# Patient Record
Sex: FEMALE | Race: WHITE | NOT HISPANIC OR LATINO | Employment: UNEMPLOYED | URBAN - METROPOLITAN AREA
[De-identification: names, ages, dates, MRNs, and addresses within clinical notes are randomized per-mention and may not be internally consistent; named-entity substitution may affect disease eponyms.]

---

## 2018-02-06 ENCOUNTER — CONVERSION ENCOUNTER (OUTPATIENT)
Dept: FAMILY MEDICINE CLINIC | Facility: CLINIC | Age: 58
End: 2018-02-06

## 2018-02-06 ENCOUNTER — OFFICE VISIT CONVERTED (OUTPATIENT)
Dept: FAMILY MEDICINE CLINIC | Facility: CLINIC | Age: 58
End: 2018-02-06
Attending: FAMILY MEDICINE

## 2018-04-24 ENCOUNTER — CONVERSION ENCOUNTER (OUTPATIENT)
Dept: FAMILY MEDICINE CLINIC | Facility: CLINIC | Age: 58
End: 2018-04-24

## 2018-04-24 ENCOUNTER — OFFICE VISIT CONVERTED (OUTPATIENT)
Dept: FAMILY MEDICINE CLINIC | Facility: CLINIC | Age: 58
End: 2018-04-24
Attending: FAMILY MEDICINE

## 2018-05-02 ENCOUNTER — CONVERSION ENCOUNTER (OUTPATIENT)
Dept: FAMILY MEDICINE CLINIC | Facility: CLINIC | Age: 58
End: 2018-05-02

## 2018-05-02 ENCOUNTER — OFFICE VISIT CONVERTED (OUTPATIENT)
Dept: FAMILY MEDICINE CLINIC | Facility: CLINIC | Age: 58
End: 2018-05-02
Attending: NURSE PRACTITIONER

## 2018-08-09 ENCOUNTER — OFFICE VISIT CONVERTED (OUTPATIENT)
Dept: FAMILY MEDICINE CLINIC | Facility: CLINIC | Age: 58
End: 2018-08-09
Attending: NURSE PRACTITIONER

## 2018-11-06 ENCOUNTER — OFFICE VISIT CONVERTED (OUTPATIENT)
Dept: FAMILY MEDICINE CLINIC | Facility: CLINIC | Age: 58
End: 2018-11-06
Attending: FAMILY MEDICINE

## 2019-05-14 ENCOUNTER — OFFICE VISIT CONVERTED (OUTPATIENT)
Dept: FAMILY MEDICINE CLINIC | Facility: CLINIC | Age: 59
End: 2019-05-14
Attending: FAMILY MEDICINE

## 2019-06-05 ENCOUNTER — HOSPITAL ENCOUNTER (OUTPATIENT)
Dept: OTHER | Facility: HOSPITAL | Age: 59
Discharge: HOME | End: 2019-06-05

## 2019-06-05 LAB
ALBUMIN SERPL-MCNC: 4 G/DL (ref 3.5–5)
ALBUMIN/GLOB SERPL: 1.4 {RATIO} (ref 1.4–2.6)
ALP SERPL-CCNC: 87 U/L (ref 53–141)
ALT SERPL-CCNC: 21 U/L (ref 10–40)
ANION GAP SERPL CALC-SCNC: 17 MMOL/L (ref 8–19)
AST SERPL-CCNC: 10 U/L (ref 15–50)
BILIRUB SERPL-MCNC: 0.27 MG/DL (ref 0.2–1.3)
BUN SERPL-MCNC: 18 MG/DL (ref 5–25)
BUN/CREAT SERPL: 15 {RATIO} (ref 6–20)
CALCIUM SERPL-MCNC: 9.1 MG/DL (ref 8.7–10.4)
CHLORIDE SERPL-SCNC: 105 MMOL/L (ref 99–111)
CHOLEST SERPL-MCNC: 141 MG/DL (ref 107–200)
CHOLEST/HDLC SERPL: 3.2 {RATIO} (ref 3–6)
CONV CO2: 25 MMOL/L (ref 22–32)
CONV TOTAL PROTEIN: 6.9 G/DL (ref 6.3–8.2)
CREAT UR-MCNC: 1.19 MG/DL (ref 0.5–0.9)
GFR SERPLBLD BASED ON 1.73 SQ M-ARVRAT: 50 ML/MIN/{1.73_M2}
GLOBULIN UR ELPH-MCNC: 2.9 G/DL (ref 2–3.5)
GLUCOSE SERPL-MCNC: 84 MG/DL (ref 65–99)
HDLC SERPL-MCNC: 44 MG/DL (ref 40–60)
LDLC SERPL CALC-MCNC: 72 MG/DL (ref 70–100)
OSMOLALITY SERPL CALC.SUM OF ELEC: 297 MOSM/KG (ref 273–304)
POTASSIUM SERPL-SCNC: 4.4 MMOL/L (ref 3.5–5.3)
SODIUM SERPL-SCNC: 143 MMOL/L (ref 135–147)
TRIGL SERPL-MCNC: 124 MG/DL (ref 40–150)
VLDLC SERPL-MCNC: 25 MG/DL (ref 5–37)

## 2019-11-11 ENCOUNTER — OFFICE VISIT CONVERTED (OUTPATIENT)
Dept: FAMILY MEDICINE CLINIC | Facility: CLINIC | Age: 59
End: 2019-11-11
Attending: NURSE PRACTITIONER

## 2019-11-12 ENCOUNTER — HOSPITAL ENCOUNTER (OUTPATIENT)
Dept: FAMILY MEDICINE CLINIC | Facility: CLINIC | Age: 59
Discharge: HOME | End: 2019-11-12
Attending: NURSE PRACTITIONER

## 2019-11-12 LAB
ALBUMIN SERPL-MCNC: 4.1 G/DL (ref 3.5–5)
ALBUMIN/GLOB SERPL: 1.3 {RATIO} (ref 1.4–2.6)
ALP SERPL-CCNC: 91 U/L (ref 53–141)
ALT SERPL-CCNC: 24 U/L (ref 10–40)
AMPHETAMINES UR QL SCN: NEGATIVE
AMYLASE SERPL-CCNC: 98 U/L (ref 30–110)
ANION GAP SERPL CALC-SCNC: 22 MMOL/L (ref 8–19)
APPEARANCE UR: CLEAR
AST SERPL-CCNC: 11 U/L (ref 15–50)
BARBITURATES UR QL SCN: NEGATIVE
BASOPHILS # BLD AUTO: 0.04 10*3/UL (ref 0–0.2)
BASOPHILS NFR BLD AUTO: 0.6 % (ref 0–3)
BENZODIAZ UR QL SCN: NEGATIVE
BILIRUB SERPL-MCNC: 0.24 MG/DL (ref 0.2–1.3)
BILIRUB UR QL: NEGATIVE
BUN SERPL-MCNC: 23 MG/DL (ref 5–25)
BUN/CREAT SERPL: 18 {RATIO} (ref 6–20)
CALCIUM SERPL-MCNC: 9.9 MG/DL (ref 8.7–10.4)
CHLORIDE SERPL-SCNC: 104 MMOL/L (ref 99–111)
CHOLEST SERPL-MCNC: 215 MG/DL (ref 107–200)
CHOLEST/HDLC SERPL: 4.9 {RATIO} (ref 3–6)
COLOR UR: YELLOW
CONV ABS IMM GRAN: 0.01 10*3/UL (ref 0–0.2)
CONV BACTERIA: NEGATIVE
CONV CO2: 20 MMOL/L (ref 22–32)
CONV COCAINE, UR: NEGATIVE
CONV COLLECTION SOURCE (UA): ABNORMAL
CONV CREATININE URINE, RANDOM: 138.4 MG/DL (ref 10–300)
CONV HYALINE CASTS IN URINE MICRO: ABNORMAL /[LPF]
CONV IMMATURE GRAN: 0.1 % (ref 0–1.8)
CONV MICROALBUM.,U,RANDOM: 23.6 MG/L (ref 0–20)
CONV TOTAL PROTEIN: 7.3 G/DL (ref 6.3–8.2)
CONV UROBILINOGEN IN URINE BY AUTOMATED TEST STRIP: 0.2 {EHRLICHU}/DL (ref 0.1–1)
CREAT UR-MCNC: 1.28 MG/DL (ref 0.5–0.9)
DEPRECATED RDW RBC AUTO: 45.1 FL (ref 36.4–46.3)
EOSINOPHIL # BLD AUTO: 0.31 10*3/UL (ref 0–0.7)
EOSINOPHIL # BLD AUTO: 4.5 % (ref 0–7)
ERYTHROCYTE [DISTWIDTH] IN BLOOD BY AUTOMATED COUNT: 13.3 % (ref 11.7–14.4)
GFR SERPLBLD BASED ON 1.73 SQ M-ARVRAT: 46 ML/MIN/{1.73_M2}
GLOBULIN UR ELPH-MCNC: 3.2 G/DL (ref 2–3.5)
GLUCOSE SERPL-MCNC: 89 MG/DL (ref 65–99)
GLUCOSE UR QL: NEGATIVE MG/DL
HCT VFR BLD AUTO: 42.5 % (ref 37–47)
HDLC SERPL-MCNC: 44 MG/DL (ref 40–60)
HGB BLD-MCNC: 13.3 G/DL (ref 12–16)
HGB UR QL STRIP: NEGATIVE
KETONES UR QL STRIP: NEGATIVE MG/DL
LDLC SERPL CALC-MCNC: 129 MG/DL (ref 70–100)
LEUKOCYTE ESTERASE UR QL STRIP: ABNORMAL
LIPASE SERPL-CCNC: 125 U/L (ref 5–51)
LYMPHOCYTES # BLD AUTO: 2.83 10*3/UL (ref 1–5)
LYMPHOCYTES NFR BLD AUTO: 41 % (ref 20–45)
MCH RBC QN AUTO: 29 PG (ref 27–31)
MCHC RBC AUTO-ENTMCNC: 31.3 G/DL (ref 33–37)
MCV RBC AUTO: 92.8 FL (ref 81–99)
METHADONE UR QL SCN: NEGATIVE
MICROALBUMIN/CREAT UR: 17.1 MG/G{CRE} (ref 0–35)
MONOCYTES # BLD AUTO: 0.52 10*3/UL (ref 0.2–1.2)
MONOCYTES NFR BLD AUTO: 7.5 % (ref 3–10)
NEUTROPHILS # BLD AUTO: 3.19 10*3/UL (ref 2–8)
NEUTROPHILS NFR BLD AUTO: 46.3 % (ref 30–85)
NITRITE UR QL STRIP: NEGATIVE
NRBC CBCN: 0 % (ref 0–0.7)
OPIATES TESTED UR SCN: NEGATIVE
OSMOLALITY SERPL CALC.SUM OF ELEC: 293 MOSM/KG (ref 273–304)
OXYCODONE UR QL SCN: NEGATIVE
PCP UR QL: NEGATIVE
PH UR STRIP.AUTO: 5 [PH] (ref 5–8)
PLATELET # BLD AUTO: 283 10*3/UL (ref 130–400)
PMV BLD AUTO: 10.5 FL (ref 9.4–12.3)
POTASSIUM SERPL-SCNC: 5.6 MMOL/L (ref 3.5–5.3)
PROT UR QL: NEGATIVE MG/DL
RBC # BLD AUTO: 4.58 10*6/UL (ref 4.2–5.4)
RBC #/AREA URNS HPF: ABNORMAL /[HPF]
SODIUM SERPL-SCNC: 140 MMOL/L (ref 135–147)
SP GR UR: 1.02 (ref 1–1.03)
SQUAMOUS SPT QL MICRO: ABNORMAL /[HPF]
T4 FREE SERPL-MCNC: 1.2 NG/DL (ref 0.9–1.8)
THC SERPLBLD CFM-MCNC: NEGATIVE NG/ML
TRIGL SERPL-MCNC: 209 MG/DL (ref 40–150)
TSH SERPL-ACNC: 3.74 M[IU]/L (ref 0.27–4.2)
VLDLC SERPL-MCNC: 42 MG/DL (ref 5–37)
WBC # BLD AUTO: 6.9 10*3/UL (ref 4.8–10.8)
WBC #/AREA URNS HPF: ABNORMAL /[HPF]

## 2019-11-18 ENCOUNTER — HOSPITAL ENCOUNTER (OUTPATIENT)
Dept: FAMILY MEDICINE CLINIC | Facility: CLINIC | Age: 59
Discharge: HOME | End: 2019-11-18
Attending: NURSE PRACTITIONER

## 2019-11-18 LAB
ALBUMIN SERPL-MCNC: 3.7 G/DL (ref 3.5–5)
ALBUMIN/GLOB SERPL: 1.3 {RATIO} (ref 1.4–2.6)
ALP SERPL-CCNC: 78 U/L (ref 53–141)
ALT SERPL-CCNC: 18 U/L (ref 10–40)
AMYLASE SERPL-CCNC: 82 U/L (ref 30–110)
ANION GAP SERPL CALC-SCNC: 15 MMOL/L (ref 8–19)
AST SERPL-CCNC: 11 U/L (ref 15–50)
BILIRUB SERPL-MCNC: 0.18 MG/DL (ref 0.2–1.3)
BUN SERPL-MCNC: 19 MG/DL (ref 5–25)
BUN/CREAT SERPL: 17 {RATIO} (ref 6–20)
CALCIUM SERPL-MCNC: 9.7 MG/DL (ref 8.7–10.4)
CHLORIDE SERPL-SCNC: 106 MMOL/L (ref 99–111)
CONV CO2: 24 MMOL/L (ref 22–32)
CONV TOTAL PROTEIN: 6.5 G/DL (ref 6.3–8.2)
CREAT UR-MCNC: 1.11 MG/DL (ref 0.5–0.9)
GFR SERPLBLD BASED ON 1.73 SQ M-ARVRAT: 54 ML/MIN/{1.73_M2}
GLOBULIN UR ELPH-MCNC: 2.8 G/DL (ref 2–3.5)
GLUCOSE SERPL-MCNC: 117 MG/DL (ref 65–99)
LIPASE SERPL-CCNC: 120 U/L (ref 5–51)
OSMOLALITY SERPL CALC.SUM OF ELEC: 293 MOSM/KG (ref 273–304)
POTASSIUM SERPL-SCNC: 4.9 MMOL/L (ref 3.5–5.3)
SODIUM SERPL-SCNC: 140 MMOL/L (ref 135–147)

## 2019-12-16 ENCOUNTER — OFFICE VISIT CONVERTED (OUTPATIENT)
Dept: GASTROENTEROLOGY | Facility: CLINIC | Age: 59
End: 2019-12-16
Attending: NURSE PRACTITIONER

## 2019-12-16 ENCOUNTER — CONVERSION ENCOUNTER (OUTPATIENT)
Dept: GASTROENTEROLOGY | Facility: CLINIC | Age: 59
End: 2019-12-16

## 2020-01-07 ENCOUNTER — HOSPITAL ENCOUNTER (OUTPATIENT)
Dept: GASTROENTEROLOGY | Facility: HOSPITAL | Age: 60
Setting detail: HOSPITAL OUTPATIENT SURGERY
Discharge: HOME | End: 2020-01-07
Attending: INTERNAL MEDICINE

## 2020-01-22 ENCOUNTER — HOSPITAL ENCOUNTER (OUTPATIENT)
Dept: GENERAL RADIOLOGY | Facility: HOSPITAL | Age: 60
Discharge: HOME | End: 2020-01-22
Attending: NURSE PRACTITIONER

## 2020-02-13 ENCOUNTER — OFFICE VISIT CONVERTED (OUTPATIENT)
Dept: FAMILY MEDICINE CLINIC | Facility: CLINIC | Age: 60
End: 2020-02-13
Attending: NURSE PRACTITIONER

## 2020-02-24 ENCOUNTER — HOSPITAL ENCOUNTER (OUTPATIENT)
Dept: FAMILY MEDICINE CLINIC | Facility: CLINIC | Age: 60
Discharge: HOME | End: 2020-02-24
Attending: NURSE PRACTITIONER

## 2020-02-24 LAB
ALBUMIN SERPL-MCNC: 3.8 G/DL (ref 3.5–5)
ALBUMIN/GLOB SERPL: 1.2 {RATIO} (ref 1.4–2.6)
ALP SERPL-CCNC: 71 U/L (ref 53–141)
ALT SERPL-CCNC: 18 U/L (ref 10–40)
ANION GAP SERPL CALC-SCNC: 19 MMOL/L (ref 8–19)
AST SERPL-CCNC: 10 U/L (ref 15–50)
BILIRUB SERPL-MCNC: 0.32 MG/DL (ref 0.2–1.3)
BUN SERPL-MCNC: 20 MG/DL (ref 5–25)
BUN/CREAT SERPL: 16 {RATIO} (ref 6–20)
CALCIUM SERPL-MCNC: 9.8 MG/DL (ref 8.7–10.4)
CHLORIDE SERPL-SCNC: 104 MMOL/L (ref 99–111)
CHOLEST SERPL-MCNC: 232 MG/DL (ref 107–200)
CHOLEST/HDLC SERPL: 5 {RATIO} (ref 3–6)
CONV CO2: 24 MMOL/L (ref 22–32)
CONV TOTAL PROTEIN: 6.9 G/DL (ref 6.3–8.2)
CREAT UR-MCNC: 1.28 MG/DL (ref 0.5–0.9)
GFR SERPLBLD BASED ON 1.73 SQ M-ARVRAT: 46 ML/MIN/{1.73_M2}
GLOBULIN UR ELPH-MCNC: 3.1 G/DL (ref 2–3.5)
GLUCOSE SERPL-MCNC: 90 MG/DL (ref 65–99)
HDLC SERPL-MCNC: 46 MG/DL (ref 40–60)
LDLC SERPL CALC-MCNC: 150 MG/DL (ref 70–100)
OSMOLALITY SERPL CALC.SUM OF ELEC: 296 MOSM/KG (ref 273–304)
POTASSIUM SERPL-SCNC: 5.3 MMOL/L (ref 3.5–5.3)
SODIUM SERPL-SCNC: 142 MMOL/L (ref 135–147)
TRIGL SERPL-MCNC: 180 MG/DL (ref 40–150)
VLDLC SERPL-MCNC: 36 MG/DL (ref 5–37)

## 2020-05-12 ENCOUNTER — TELEMEDICINE CONVERTED (OUTPATIENT)
Dept: FAMILY MEDICINE CLINIC | Facility: CLINIC | Age: 60
End: 2020-05-12
Attending: NURSE PRACTITIONER

## 2020-06-02 ENCOUNTER — HOSPITAL ENCOUNTER (OUTPATIENT)
Dept: FAMILY MEDICINE CLINIC | Facility: CLINIC | Age: 60
Discharge: HOME | End: 2020-06-02
Attending: NURSE PRACTITIONER

## 2020-06-02 LAB
ALBUMIN SERPL-MCNC: 4.1 G/DL (ref 3.5–5)
ALBUMIN/GLOB SERPL: 1.3 {RATIO} (ref 1.4–2.6)
ALP SERPL-CCNC: 70 U/L (ref 53–141)
ALT SERPL-CCNC: 16 U/L (ref 10–40)
ANION GAP SERPL CALC-SCNC: 16 MMOL/L (ref 8–19)
AST SERPL-CCNC: 10 U/L (ref 15–50)
BASOPHILS # BLD AUTO: 0.03 10*3/UL (ref 0–0.2)
BASOPHILS NFR BLD AUTO: 0.4 % (ref 0–3)
BILIRUB SERPL-MCNC: 0.24 MG/DL (ref 0.2–1.3)
BUN SERPL-MCNC: 16 MG/DL (ref 5–25)
BUN/CREAT SERPL: 12 {RATIO} (ref 6–20)
CALCIUM SERPL-MCNC: 9.7 MG/DL (ref 8.7–10.4)
CHLORIDE SERPL-SCNC: 106 MMOL/L (ref 99–111)
CHOLEST SERPL-MCNC: 195 MG/DL (ref 107–200)
CHOLEST/HDLC SERPL: 4.3 {RATIO} (ref 3–6)
CONV ABS IMM GRAN: 0.02 10*3/UL (ref 0–0.2)
CONV CO2: 24 MMOL/L (ref 22–32)
CONV IMMATURE GRAN: 0.3 % (ref 0–1.8)
CONV TOTAL PROTEIN: 7.3 G/DL (ref 6.3–8.2)
CREAT UR-MCNC: 1.35 MG/DL (ref 0.5–0.9)
DEPRECATED RDW RBC AUTO: 44.4 FL (ref 36.4–46.3)
EOSINOPHIL # BLD AUTO: 0.25 10*3/UL (ref 0–0.7)
EOSINOPHIL # BLD AUTO: 3.7 % (ref 0–7)
ERYTHROCYTE [DISTWIDTH] IN BLOOD BY AUTOMATED COUNT: 13.2 % (ref 11.7–14.4)
GFR SERPLBLD BASED ON 1.73 SQ M-ARVRAT: 43 ML/MIN/{1.73_M2}
GLOBULIN UR ELPH-MCNC: 3.2 G/DL (ref 2–3.5)
GLUCOSE SERPL-MCNC: 97 MG/DL (ref 65–99)
HCT VFR BLD AUTO: 41.9 % (ref 37–47)
HDLC SERPL-MCNC: 45 MG/DL (ref 40–60)
HGB BLD-MCNC: 13.3 G/DL (ref 12–16)
LDLC SERPL CALC-MCNC: 119 MG/DL (ref 70–100)
LYMPHOCYTES # BLD AUTO: 2.55 10*3/UL (ref 1–5)
LYMPHOCYTES NFR BLD AUTO: 37.6 % (ref 20–45)
MCH RBC QN AUTO: 29.3 PG (ref 27–31)
MCHC RBC AUTO-ENTMCNC: 31.7 G/DL (ref 33–37)
MCV RBC AUTO: 92.3 FL (ref 81–99)
MONOCYTES # BLD AUTO: 0.43 10*3/UL (ref 0.2–1.2)
MONOCYTES NFR BLD AUTO: 6.3 % (ref 3–10)
NEUTROPHILS # BLD AUTO: 3.51 10*3/UL (ref 2–8)
NEUTROPHILS NFR BLD AUTO: 51.7 % (ref 30–85)
NRBC CBCN: 0 % (ref 0–0.7)
OSMOLALITY SERPL CALC.SUM OF ELEC: 293 MOSM/KG (ref 273–304)
PLATELET # BLD AUTO: 289 10*3/UL (ref 130–400)
PMV BLD AUTO: 10.5 FL (ref 9.4–12.3)
POTASSIUM SERPL-SCNC: 4.7 MMOL/L (ref 3.5–5.3)
RBC # BLD AUTO: 4.54 10*6/UL (ref 4.2–5.4)
SODIUM SERPL-SCNC: 141 MMOL/L (ref 135–147)
TRIGL SERPL-MCNC: 156 MG/DL (ref 40–150)
VLDLC SERPL-MCNC: 31 MG/DL (ref 5–37)
WBC # BLD AUTO: 6.79 10*3/UL (ref 4.8–10.8)

## 2020-08-12 ENCOUNTER — HOSPITAL ENCOUNTER (OUTPATIENT)
Dept: FAMILY MEDICINE CLINIC | Facility: CLINIC | Age: 60
Discharge: HOME | End: 2020-08-12
Attending: NURSE PRACTITIONER

## 2020-08-12 ENCOUNTER — OFFICE VISIT CONVERTED (OUTPATIENT)
Dept: FAMILY MEDICINE CLINIC | Facility: CLINIC | Age: 60
End: 2020-08-12
Attending: NURSE PRACTITIONER

## 2020-08-12 LAB
BASOPHILS # BLD AUTO: 0.05 10*3/UL (ref 0–0.2)
BASOPHILS NFR BLD AUTO: 0.6 % (ref 0–3)
CONV ABS IMM GRAN: 0.04 10*3/UL (ref 0–0.2)
CONV IMMATURE GRAN: 0.5 % (ref 0–1.8)
DEPRECATED RDW RBC AUTO: 45.6 FL (ref 36.4–46.3)
EOSINOPHIL # BLD AUTO: 0.2 10*3/UL (ref 0–0.7)
EOSINOPHIL # BLD AUTO: 2.4 % (ref 0–7)
ERYTHROCYTE [DISTWIDTH] IN BLOOD BY AUTOMATED COUNT: 13.6 % (ref 11.7–14.4)
FOLATE SERPL-MCNC: 16.1 NG/ML (ref 4.8–20)
HCT VFR BLD AUTO: 42.2 % (ref 37–47)
HGB BLD-MCNC: 13.5 G/DL (ref 12–16)
LYMPHOCYTES # BLD AUTO: 2.95 10*3/UL (ref 1–5)
LYMPHOCYTES NFR BLD AUTO: 35.7 % (ref 20–45)
MCH RBC QN AUTO: 29.2 PG (ref 27–31)
MCHC RBC AUTO-ENTMCNC: 32 G/DL (ref 33–37)
MCV RBC AUTO: 91.1 FL (ref 81–99)
MONOCYTES # BLD AUTO: 0.55 10*3/UL (ref 0.2–1.2)
MONOCYTES NFR BLD AUTO: 6.7 % (ref 3–10)
NEUTROPHILS # BLD AUTO: 4.48 10*3/UL (ref 2–8)
NEUTROPHILS NFR BLD AUTO: 54.1 % (ref 30–85)
NRBC CBCN: 0 % (ref 0–0.7)
PLATELET # BLD AUTO: 327 10*3/UL (ref 130–400)
PMV BLD AUTO: 10.4 FL (ref 9.4–12.3)
RBC # BLD AUTO: 4.63 10*6/UL (ref 4.2–5.4)
VIT B12 SERPL-MCNC: 367 PG/ML (ref 211–911)
WBC # BLD AUTO: 8.27 10*3/UL (ref 4.8–10.8)

## 2020-08-13 LAB
ALBUMIN SERPL-MCNC: 4.3 G/DL (ref 3.5–5)
ALBUMIN/GLOB SERPL: 1.3 {RATIO} (ref 1.4–2.6)
ALP SERPL-CCNC: 104 U/L (ref 53–141)
ALT SERPL-CCNC: 17 U/L (ref 10–40)
AMYLASE SERPL-CCNC: 76 U/L (ref 30–110)
ANION GAP SERPL CALC-SCNC: 17 MMOL/L (ref 8–19)
AST SERPL-CCNC: 12 U/L (ref 15–50)
BILIRUB SERPL-MCNC: 0.24 MG/DL (ref 0.2–1.3)
BUN SERPL-MCNC: 23 MG/DL (ref 5–25)
BUN/CREAT SERPL: 19 {RATIO} (ref 6–20)
CALCIUM SERPL-MCNC: 10.7 MG/DL (ref 8.7–10.4)
CHLORIDE SERPL-SCNC: 105 MMOL/L (ref 99–111)
CONV CO2: 24 MMOL/L (ref 22–32)
CONV TOTAL PROTEIN: 7.7 G/DL (ref 6.3–8.2)
CREAT UR-MCNC: 1.23 MG/DL (ref 0.5–0.9)
FERRITIN SERPL-MCNC: 72 NG/ML (ref 10–200)
GFR SERPLBLD BASED ON 1.73 SQ M-ARVRAT: 48 ML/MIN/{1.73_M2}
GLOBULIN UR ELPH-MCNC: 3.4 G/DL (ref 2–3.5)
GLUCOSE SERPL-MCNC: 94 MG/DL (ref 65–99)
IRON SATN MFR SERPL: 17 % (ref 20–55)
IRON SERPL-MCNC: 59 UG/DL (ref 60–170)
LIPASE SERPL-CCNC: 50 U/L (ref 5–51)
OSMOLALITY SERPL CALC.SUM OF ELEC: 295 MOSM/KG (ref 273–304)
POTASSIUM SERPL-SCNC: 5.3 MMOL/L (ref 3.5–5.3)
SODIUM SERPL-SCNC: 141 MMOL/L (ref 135–147)
T4 FREE SERPL-MCNC: 1.2 NG/DL (ref 0.9–1.8)
TIBC SERPL-MCNC: 345 UG/DL (ref 245–450)
TRANSFERRIN SERPL-MCNC: 241 MG/DL (ref 250–380)
TSH SERPL-ACNC: 4.07 M[IU]/L (ref 0.27–4.2)

## 2020-09-21 ENCOUNTER — HOSPITAL ENCOUNTER (OUTPATIENT)
Dept: OTHER | Facility: HOSPITAL | Age: 60
Discharge: HOME | End: 2020-09-21
Attending: NURSE PRACTITIONER

## 2020-09-21 LAB
25(OH)D3 SERPL-MCNC: 18.4 NG/ML (ref 30–100)
ALBUMIN SERPL-MCNC: 4 G/DL (ref 3.5–5)
ANION GAP SERPL CALC-SCNC: 16 MMOL/L (ref 8–19)
APPEARANCE UR: CLEAR
BASOPHILS # BLD AUTO: 0.04 10*3/UL (ref 0–0.2)
BASOPHILS NFR BLD AUTO: 0.4 % (ref 0–3)
BILIRUB UR QL: NEGATIVE
BUN SERPL-MCNC: 20 MG/DL (ref 5–25)
BUN/CREAT SERPL: 17 {RATIO} (ref 6–20)
CALCIUM SERPL-MCNC: 9 MG/DL (ref 8.7–10.4)
CHLORIDE SERPL-SCNC: 103 MMOL/L (ref 99–111)
COLOR UR: YELLOW
CONV ABS IMM GRAN: 0.03 10*3/UL (ref 0–0.2)
CONV CO2: 24 MMOL/L (ref 22–32)
CONV COLLECTION SOURCE (UA): NORMAL
CONV CREATININE URINE, RANDOM: 242 MG/DL (ref 10–300)
CONV IMMATURE GRAN: 0.3 % (ref 0–1.8)
CONV PROTEIN TO CREATININE RATIO (RANDOM URINE): 0.07 {RATIO} (ref 0–0.1)
CONV UROBILINOGEN IN URINE BY AUTOMATED TEST STRIP: 1 {EHRLICHU}/DL (ref 0.1–1)
CREAT UR-MCNC: 1.18 MG/DL (ref 0.5–0.9)
DEPRECATED RDW RBC AUTO: 45 FL (ref 36.4–46.3)
EOSINOPHIL # BLD AUTO: 0.28 10*3/UL (ref 0–0.7)
EOSINOPHIL # BLD AUTO: 2.9 % (ref 0–7)
ERYTHROCYTE [DISTWIDTH] IN BLOOD BY AUTOMATED COUNT: 13.5 % (ref 11.7–14.4)
GFR SERPLBLD BASED ON 1.73 SQ M-ARVRAT: 50 ML/MIN/{1.73_M2}
GLUCOSE SERPL-MCNC: 94 MG/DL (ref 65–99)
GLUCOSE UR QL: NEGATIVE MG/DL
HCT VFR BLD AUTO: 38.7 % (ref 37–47)
HGB BLD-MCNC: 12.9 G/DL (ref 12–16)
HGB UR QL STRIP: NEGATIVE
KETONES UR QL STRIP: NEGATIVE MG/DL
LEUKOCYTE ESTERASE UR QL STRIP: NEGATIVE
LYMPHOCYTES # BLD AUTO: 2.88 10*3/UL (ref 1–5)
LYMPHOCYTES NFR BLD AUTO: 30.2 % (ref 20–45)
MCH RBC QN AUTO: 30.2 PG (ref 27–31)
MCHC RBC AUTO-ENTMCNC: 33.3 G/DL (ref 33–37)
MCV RBC AUTO: 90.6 FL (ref 81–99)
MONOCYTES # BLD AUTO: 0.59 10*3/UL (ref 0.2–1.2)
MONOCYTES NFR BLD AUTO: 6.2 % (ref 3–10)
NEUTROPHILS # BLD AUTO: 5.73 10*3/UL (ref 2–8)
NEUTROPHILS NFR BLD AUTO: 60 % (ref 30–85)
NITRITE UR QL STRIP: NEGATIVE
NRBC CBCN: 0 % (ref 0–0.7)
PH UR STRIP.AUTO: 5 [PH] (ref 5–8)
PHOSPHATE SERPL-MCNC: 3 MG/DL (ref 2.4–4.5)
PLATELET # BLD AUTO: 276 10*3/UL (ref 130–400)
PMV BLD AUTO: 10.1 FL (ref 9.4–12.3)
POTASSIUM SERPL-SCNC: 4.2 MMOL/L (ref 3.5–5.3)
PROT UR QL: NEGATIVE MG/DL
PROT UR-MCNC: 16.1 MG/DL
RBC # BLD AUTO: 4.27 10*6/UL (ref 4.2–5.4)
SODIUM SERPL-SCNC: 139 MMOL/L (ref 135–147)
SP GR UR: 1.02 (ref 1–1.03)
WBC # BLD AUTO: 9.55 10*3/UL (ref 4.8–10.8)

## 2020-10-16 ENCOUNTER — HOSPITAL ENCOUNTER (OUTPATIENT)
Dept: FAMILY MEDICINE CLINIC | Facility: CLINIC | Age: 60
Discharge: HOME | End: 2020-10-16

## 2020-10-18 LAB — SARS-COV-2 RNA SPEC QL NAA+PROBE: NOT DETECTED

## 2020-10-21 ENCOUNTER — HOSPITAL ENCOUNTER (OUTPATIENT)
Dept: OTHER | Facility: HOSPITAL | Age: 60
Discharge: HOME | End: 2020-10-21
Attending: NURSE PRACTITIONER

## 2020-10-21 LAB — PTH-INTACT SERPL-MCNC: 54 PG/ML (ref 11.1–79.5)

## 2020-11-04 ENCOUNTER — OFFICE VISIT CONVERTED (OUTPATIENT)
Dept: SURGERY | Facility: CLINIC | Age: 60
End: 2020-11-04
Attending: SURGERY

## 2020-11-05 ENCOUNTER — HOSPITAL ENCOUNTER (OUTPATIENT)
Dept: NUCLEAR MEDICINE | Facility: HOSPITAL | Age: 60
Discharge: HOME | End: 2020-11-05
Attending: SURGERY

## 2020-11-05 LAB — SARS-COV-2 RNA SPEC QL NAA+PROBE: NOT DETECTED

## 2020-11-18 ENCOUNTER — OFFICE VISIT CONVERTED (OUTPATIENT)
Dept: SURGERY | Facility: CLINIC | Age: 60
End: 2020-11-18
Attending: SURGERY

## 2020-11-20 ENCOUNTER — OFFICE VISIT CONVERTED (OUTPATIENT)
Dept: FAMILY MEDICINE CLINIC | Facility: CLINIC | Age: 60
End: 2020-11-20
Attending: NURSE PRACTITIONER

## 2020-11-24 ENCOUNTER — HOSPITAL ENCOUNTER (OUTPATIENT)
Dept: FAMILY MEDICINE CLINIC | Facility: CLINIC | Age: 60
Discharge: HOME | End: 2020-11-24
Attending: NURSE PRACTITIONER

## 2020-11-24 LAB
ALBUMIN SERPL-MCNC: 4 G/DL (ref 3.5–5)
ALBUMIN/GLOB SERPL: 1 {RATIO} (ref 1.4–2.6)
ALP SERPL-CCNC: 165 U/L (ref 53–141)
ALT SERPL-CCNC: 33 U/L (ref 10–40)
AMPHETAMINES UR QL SCN: NEGATIVE
ANION GAP SERPL CALC-SCNC: 19 MMOL/L (ref 8–19)
APPEARANCE UR: CLEAR
AST SERPL-CCNC: 14 U/L (ref 15–50)
BARBITURATES UR QL SCN: NEGATIVE
BASOPHILS # BLD AUTO: 0.05 10*3/UL (ref 0–0.2)
BASOPHILS NFR BLD AUTO: 0.7 % (ref 0–3)
BENZODIAZ UR QL SCN: NEGATIVE
BILIRUB SERPL-MCNC: 0.27 MG/DL (ref 0.2–1.3)
BILIRUB UR QL: NEGATIVE
BUN SERPL-MCNC: 22 MG/DL (ref 5–25)
BUN/CREAT SERPL: 17 {RATIO} (ref 6–20)
CALCIUM SERPL-MCNC: 10 MG/DL (ref 8.7–10.4)
CHLORIDE SERPL-SCNC: 102 MMOL/L (ref 99–111)
CHOLEST SERPL-MCNC: 203 MG/DL (ref 107–200)
CHOLEST/HDLC SERPL: 4.5 {RATIO} (ref 3–6)
COLOR UR: YELLOW
CONV ABS IMM GRAN: 0.04 10*3/UL (ref 0–0.2)
CONV CO2: 26 MMOL/L (ref 22–32)
CONV COCAINE, UR: NEGATIVE
CONV COLLECTION SOURCE (UA): NORMAL
CONV CREATININE URINE, RANDOM: 52.6 MG/DL (ref 10–300)
CONV IMMATURE GRAN: 0.6 % (ref 0–1.8)
CONV MICROALBUM.,U,RANDOM: <12 MG/L (ref 0–20)
CONV TOTAL PROTEIN: 8 G/DL (ref 6.3–8.2)
CONV UROBILINOGEN IN URINE BY AUTOMATED TEST STRIP: 0.2 {EHRLICHU}/DL (ref 0.1–1)
CREAT UR-MCNC: 1.29 MG/DL (ref 0.5–0.9)
DEPRECATED RDW RBC AUTO: 41.5 FL (ref 36.4–46.3)
EOSINOPHIL # BLD AUTO: 0.36 10*3/UL (ref 0–0.7)
EOSINOPHIL # BLD AUTO: 5 % (ref 0–7)
ERYTHROCYTE [DISTWIDTH] IN BLOOD BY AUTOMATED COUNT: 12.4 % (ref 11.7–14.4)
FERRITIN SERPL-MCNC: 121 NG/ML (ref 10–200)
GFR SERPLBLD BASED ON 1.73 SQ M-ARVRAT: 45 ML/MIN/{1.73_M2}
GLOBULIN UR ELPH-MCNC: 4 G/DL (ref 2–3.5)
GLUCOSE SERPL-MCNC: 92 MG/DL (ref 65–99)
GLUCOSE UR QL: NEGATIVE MG/DL
HCT VFR BLD AUTO: 40.4 % (ref 37–47)
HDLC SERPL-MCNC: 45 MG/DL (ref 40–60)
HGB BLD-MCNC: 12.5 G/DL (ref 12–16)
HGB UR QL STRIP: NEGATIVE
IRON SATN MFR SERPL: 20 % (ref 20–55)
IRON SERPL-MCNC: 57 UG/DL (ref 60–170)
KETONES UR QL STRIP: NEGATIVE MG/DL
LDLC SERPL CALC-MCNC: 137 MG/DL (ref 70–100)
LEUKOCYTE ESTERASE UR QL STRIP: NEGATIVE
LYMPHOCYTES # BLD AUTO: 2.07 10*3/UL (ref 1–5)
LYMPHOCYTES NFR BLD AUTO: 29 % (ref 20–45)
MCH RBC QN AUTO: 28 PG (ref 27–31)
MCHC RBC AUTO-ENTMCNC: 30.9 G/DL (ref 33–37)
MCV RBC AUTO: 90.6 FL (ref 81–99)
METHADONE UR QL SCN: NEGATIVE
MICROALBUMIN/CREAT UR: 22.8 MG/G{CRE} (ref 0–35)
MONOCYTES # BLD AUTO: 0.54 10*3/UL (ref 0.2–1.2)
MONOCYTES NFR BLD AUTO: 7.6 % (ref 3–10)
NEUTROPHILS # BLD AUTO: 4.07 10*3/UL (ref 2–8)
NEUTROPHILS NFR BLD AUTO: 57.1 % (ref 30–85)
NITRITE UR QL STRIP: NEGATIVE
NRBC CBCN: 0 % (ref 0–0.7)
OPIATES TESTED UR SCN: NEGATIVE
OSMOLALITY SERPL CALC.SUM OF ELEC: 297 MOSM/KG (ref 273–304)
OXYCODONE UR QL SCN: NEGATIVE
PCP UR QL: NEGATIVE
PH UR STRIP.AUTO: 6.5 [PH] (ref 5–8)
PLATELET # BLD AUTO: 431 10*3/UL (ref 130–400)
PMV BLD AUTO: 9.2 FL (ref 9.4–12.3)
POTASSIUM SERPL-SCNC: 5.3 MMOL/L (ref 3.5–5.3)
PROT UR QL: NEGATIVE MG/DL
RBC # BLD AUTO: 4.46 10*6/UL (ref 4.2–5.4)
SODIUM SERPL-SCNC: 142 MMOL/L (ref 135–147)
SP GR UR: 1.01 (ref 1–1.03)
THC SERPLBLD CFM-MCNC: NEGATIVE NG/ML
TIBC SERPL-MCNC: 286 UG/DL (ref 245–450)
TRANSFERRIN SERPL-MCNC: 200 MG/DL (ref 250–380)
TRIGL SERPL-MCNC: 105 MG/DL (ref 40–150)
VLDLC SERPL-MCNC: 21 MG/DL (ref 5–37)
WBC # BLD AUTO: 7.13 10*3/UL (ref 4.8–10.8)

## 2020-11-25 ENCOUNTER — OFFICE VISIT CONVERTED (OUTPATIENT)
Dept: SURGERY | Facility: CLINIC | Age: 60
End: 2020-11-25
Attending: SURGERY

## 2020-11-27 ENCOUNTER — HOSPITAL ENCOUNTER (OUTPATIENT)
Dept: INFUSION THERAPY | Facility: HOSPITAL | Age: 60
Discharge: HOME | End: 2020-11-27
Attending: SURGERY

## 2020-11-29 LAB
CONV RESULTS MISCELLANEOUS REFERENCE LAB TEST: NORMAL
CONV RESULTS MISCELLANEOUS REFERENCE LAB TEST: NORMAL
CONV TEST NAME: NORMAL

## 2020-12-02 ENCOUNTER — OFFICE VISIT CONVERTED (OUTPATIENT)
Dept: SURGERY | Facility: CLINIC | Age: 60
End: 2020-12-02
Attending: SURGERY

## 2021-01-19 ENCOUNTER — TELEMEDICINE CONVERTED (OUTPATIENT)
Dept: FAMILY MEDICINE CLINIC | Facility: CLINIC | Age: 61
End: 2021-01-19
Attending: NURSE PRACTITIONER

## 2021-01-19 ENCOUNTER — HOSPITAL ENCOUNTER (OUTPATIENT)
Dept: FAMILY MEDICINE CLINIC | Facility: CLINIC | Age: 61
Discharge: HOME | End: 2021-01-19
Attending: NURSE PRACTITIONER

## 2021-01-19 LAB
ALBUMIN SERPL-MCNC: 4.1 G/DL (ref 3.5–5)
ALP SERPL-CCNC: 96 U/L (ref 53–141)
ALT SERPL-CCNC: 18 U/L (ref 10–40)
AST SERPL-CCNC: 13 U/L (ref 15–50)
BASOPHILS # BLD AUTO: 0.03 10*3/UL (ref 0–0.2)
BASOPHILS NFR BLD AUTO: 0.5 % (ref 0–3)
BILIRUB SERPL-MCNC: 0.25 MG/DL (ref 0.2–1.3)
CONV ABS IMM GRAN: 0.02 10*3/UL (ref 0–0.2)
CONV BILI, CONJUGATED: <0.2 MG/DL (ref 0–0.6)
CONV IMMATURE GRAN: 0.4 % (ref 0–1.8)
CONV TOTAL PROTEIN: 7.1 G/DL (ref 6.3–8.2)
CONV UNCONJUGATED BILIRUBIN: 0.1 MG/DL (ref 0–1.1)
DEPRECATED RDW RBC AUTO: 45 FL (ref 36.4–46.3)
EOSINOPHIL # BLD AUTO: 0.22 10*3/UL (ref 0–0.7)
EOSINOPHIL # BLD AUTO: 3.9 % (ref 0–7)
ERYTHROCYTE [DISTWIDTH] IN BLOOD BY AUTOMATED COUNT: 13.7 % (ref 11.7–14.4)
HCT VFR BLD AUTO: 40 % (ref 37–47)
HGB BLD-MCNC: 12.7 G/DL (ref 12–16)
LYMPHOCYTES # BLD AUTO: 2.15 10*3/UL (ref 1–5)
LYMPHOCYTES NFR BLD AUTO: 38.3 % (ref 20–45)
MCH RBC QN AUTO: 28.5 PG (ref 27–31)
MCHC RBC AUTO-ENTMCNC: 31.8 G/DL (ref 33–37)
MCV RBC AUTO: 89.7 FL (ref 81–99)
MONOCYTES # BLD AUTO: 0.36 10*3/UL (ref 0.2–1.2)
MONOCYTES NFR BLD AUTO: 6.4 % (ref 3–10)
NEUTROPHILS # BLD AUTO: 2.83 10*3/UL (ref 2–8)
NEUTROPHILS NFR BLD AUTO: 50.5 % (ref 30–85)
NRBC CBCN: 0 % (ref 0–0.7)
PLATELET # BLD AUTO: 301 10*3/UL (ref 130–400)
PMV BLD AUTO: 9.9 FL (ref 9.4–12.3)
RBC # BLD AUTO: 4.46 10*6/UL (ref 4.2–5.4)
WBC # BLD AUTO: 5.61 10*3/UL (ref 4.8–10.8)

## 2021-02-25 ENCOUNTER — OFFICE VISIT CONVERTED (OUTPATIENT)
Dept: FAMILY MEDICINE CLINIC | Facility: CLINIC | Age: 61
End: 2021-02-25
Attending: NURSE PRACTITIONER

## 2021-05-07 NOTE — PROGRESS NOTES
Progress Note      Patient Name: Marija Burgos   Patient ID: [id number]   Sex: Female   YOB: 1960    Primary Care Provider: Michelle VERNON   Referring Provider: Michelle VERNON    Visit Date: August 12, 2020    Provider: SAULO Mccann   Location: [facility]   Location Address: [address] [place] [address]   [place]  [id number]   Location Phone: [phone]          Chief Complaint     refills  diarrhea for several wks       History Of Present Illness  Marija Burgos is a 60 year old /White female who presents for evaluation and treatment of:      refill on Tramadol, and the diarrhea has been back for several weeks.     She has osteoarthritis of the hips bilaterally, along with stage III CKD - she has been prescribed tramadol for years - she takes 50mg TID - usually two in the AM prior to going to work and then one in the afternoon. If she doesn't work, she may only take 1-2 of them. This has seemingly worked well for her.     She has been to see nephrology recently - Dr. Shay, she believes, and her follow up will be later with Dr. Nance - they have told her to lay off the Excedrin migraine - she doesn't have migraine headaches, but she has been taking up to 4 of these daily for the caffeine - she was unaware that they contained NSAIDs. She just feels really fatigued without the added caffeine.     She has been having diarrhea again - was seen for it last fall - referred to GI, Dr. Sahni - found to have pyloric stenosis, grade B esophagitis, and a hiatal hernia - they recommended a c'scope as well but she deferred and cologuard was done which was negative. She went to see Dr. Villarreal in March for the pyloric stenosis - he recommended GES, CT scan, EUS, and c'scope but she deferred all of that. The diarrhea was recurrent in April, then resolved, and now it is back again - she is having painless, nocturnal stools - she will get up several times per  night. If she moves her bowels during the day then it is a loose stool. No nausea or vomiting. The diarrhea is worse should she lay down vs. sleeping sitting up.     She has noticed dizziness in the past few weeks, so she started checking her BP at home - she has had readings as low as 97/49, but average seems to be 110 to 120 over 60s. No chest pain or palpitations. No headaches, shortness of breath, or swelling in her legs.       Past Medical History  Disease Name Date Onset Notes   GERD (gastroesophageal reflux disease) --  --    High risk medications (not anticoagulants) long-term use 05/12/2020 --    Hyperlipidemia --  --    Hypertension, Benign Essential --  --    Osteoarthritis 05/12/2020 --    Stage III chronic kidney disease 05/12/2020 --    Statin not tolerated 05/12/2020 --          Past Surgical History  Procedure Name Date Notes   *Denies any surgical procedures --  --          Medication List  Name Date Started Instructions   lisinopril 40 mg oral tablet 05/12/2020 take 1 tablet (40 mg) by oral route once daily for 90 days   tramadol 50 mg oral tablet 05/12/2020 take 2 takes Q am PO, then 1 tab Q evening for OA pain         Allergy List  Allergen Name Date Reaction Notes   atorvastatin --  aches --    Neosporin --  --  --        Allergies Reconciled  Family Medical History  Disease Name Relative/Age Notes   Arthrtis Mother/   Mother         Social History  Finding Status Start/Stop Quantity Notes   Alcohol Former --/-- --  drank alcohol in the past, 7 or less drinks per week   Exercises regularly --  --/-- --  0 times per week   Recreational Drug Use Never --/-- --  never used   Second hand smoke exposure Unknown --/-- --  no   Tobacco Former --/-- 0.5 PPD former smoker, smokes less than 1 pack per day, for 10 years, never uses other tobacco products   Uses seatbelts --  --/-- --  no         Review of Systems  · Constitutional  o Admits  o : fatigue  o Denies  o : fever, chills, body  aches  · Eyes  o Denies  o : blurred vision, changes in vision  · HENT  o Denies  o : headaches, vertigo  · Cardiovascular  o Denies  o : chest pain, syncope, dyspnea on exertion, lower extremity edema, palpitations  · Respiratory  o Denies  o : shortness of breath, wheezing, cough  · Gastrointestinal  o Admits  o : diarrhea  o Denies  o : nausea, vomiting, loss of appetite, dysphagia, abdominal pain, blood in stools, hematochezia  · Genitourinary  o Denies  o : dysuria, urinary retention  · Integument  o Denies  o : pigmentation changes, skin dryness, hair growth change, nail changes  · Neurologic  o Admits  o : dizziness  · Musculoskeletal  o Admits  o : joint pain, hip pain  · Endocrine  o Denies  o : polyuria, polydipsia, cold intolerance, heat intolerance      Vitals  Date Time BP Position Site L\R Cuff Size HR RR TEMP (F) WT  HT  BMI kg/m2 BSA m2 O2 Sat HC       08/12/2020 02:48 /80 Sitting    105 - R  98.4 241lbs 16oz    96 %          Physical Examination  · Constitutional  o Appearance  o : well developed, well-nourished, in no acute distress  · Eyes  o Conjunctivae  o : conjunctivae normal, no exudates present  · Neck  o Inspection/Palpation  o : normal appearance, no masses or tenderness, trachea midline  o Thyroid  o : no thyromegaly  · Respiratory  o Respiratory Effort  o : breathing unlabored  o Auscultation of Lungs  o : clear to auscultation  · Cardiovascular  o Heart  o :   § Auscultation of Heart  § : regular rate, normal rhythm, no murmurs present  o Peripheral Vascular System  o :   § Carotid Arteries  § : normal pulses bilaterally, no bruits present  § Pedal Pulses  § : bilateral pulse strength 2+  § Extremities  § : no edema  · Gastrointestinal  o Abdominal Examination  o :   § Abdomen  § : soft, non-tender, non-distended, bowel sounds +  · Lymphatic  o Neck  o : no lymphadenopathy present  · Musculoskeletal  o General  o :   § General Musculoskeletal  § : Muscle tone, strength, and  development grossly normal.  · Skin and Subcutaneous Tissue  o General Inspection  o : no lesions present, no areas of discoloration, skin turgor normal, texture normal  · Neurologic  o Gait and Station  o :   § Gait Screening  § : normal gait  · Psychiatric  o Mood and Affect  o : mood normal, affect appropriate          Assessment  · Diarrhea     787.91/R19.7  · Essential hypertension     401.9/I10  · Fatigue     780.79/R53.83  · Osteoarthritis     715.90/M19.90  · High risk medication use     V58.69/Z79.899  · Stage III chronic kidney disease     585.3/N18.3    Problems Reconciled  Plan  · Orders  o Giardia and Cryptosporidium Enzyme Immunoassay [facility] (51755) - 787.91/R19.7 - 08/12/2020  o Stool culture and sensitivity (14359) - 787.91/R19.7 - 08/12/2020  o C difficile Toxigenic Assay (PCR) [facility] (88495) - 787.91/R19.7 - 08/12/2020  o Lactoferrin (Fecal) Qualitative (12903) - 787.91/R19.7 - 08/12/2020  o CBC with Auto Diff [facility] (33164) - 787.91/R19.7, 780.79/R53.83 - 08/12/2020  o CMP [facility] (95374) - 787.91/R19.7, 780.79/R53.83 - 08/12/2020  o Thyroid Profile (THYII) - 780.79/R53.83 - 08/12/2020  o ACO-39: Current medications updated and reviewed () - - 08/12/2020  o Amylase/Lipase Profile (ALPN) - 787.91/R19.7 - 08/12/2020  o B12 Folate levels (B12FO) - 780.79/R53.83 - 08/12/2020  o Iron Profile (Iron 15584 TIBC 30506 and Transferrin 77838) (IRONP) - 780.79/R53.83 - 08/12/2020  o Ferritin ser/plas (78095) - 780.79/R53.83 - 08/12/2020  · Medications  o tramadol 50 mg oral tablet   SIG: take 2 takes Q am PO, then 1 tab Q evening for OA pain   DISP: (90) tablet with 2 refills  Refilled on 08/12/2020     o Medications have been Reconciled  o Transition of Care or Provider Policy  · Instructions  o BRAT diet, Avoid dairy products.  o Patient advised to monitor blood pressure (B/P) at home and journal readings. Patient informed that a B/P reading at home of more than 135/85 is considered hypertension. For readings  greater ogcd491/90 or higher patient is advised to follow up in the office with readings for management. Patient advised to limit sodium intake.  o Tylenol may be used as needed every 4-6 hours for pain.  o Obtained a written consent for STEPHANIE query. Discussed the risk and benefits of the use of controlled substances with the patient, including the risk of tolerance and drug dependence. The patient has been counseled on the need to have an exit strategy, including potentially discontinuing the use of controlled substances. STEPHANIE has or will be reviewed as soon as it becomes avaliable.  o Patient was educated/instructed on their diagnosis, treatment and medications prior to discharge from the clinic today. We will cut her Lisinopril back to 20mg and she will continue to monitor BP - call if elevated greater than 135/85 consistently - call with persistent dizziness - question volume depletion d/t diarrhea or possibly a s/e of caffeine withdrawal. Labs today. Call Dr. Villarreal's office to get scheduled for EUS and c'scope - discussed importance of these tests with the patient and she now understands why they need to be done and is agreeable. Stool studies. Refill Tramadol. Follow up pending outcome of labs.   o Chronic conditions reviewed and taken into consideration for today's treatment plan.            Electronically Signed by: SAULO Mccann -Author on August 12, 2020 04:07:37 PM

## 2021-05-07 NOTE — PROGRESS NOTES
Progress Note      Patient Name: Marija Burgos   Patient ID: [id number]   Sex: Female   YOB: 1960        Visit Date: May 14, 2019    Provider: Kathrin Loera MD   Location: [facility]   Location Address: [address] [place] [address]  [id number]   Location Phone: [phone]          Chief Complaint     here for medication refills.       History Of Present Illness  Marija Burgos is a 59 year old /White female who presents for evaluation and treatment of:      She has been out of her BP meds and that is probably why it is elevated.  Her heart rate is okay now that she has sat for awhile.  She has never gotten her mammogram although we keep ordering it.  We will try again.  She did have a Cologuard.  She has been in the sun and thinks she has reacted because of her BP pill.  I agree.       Past Medical History  Disease Name Date Onset Notes   Arthritis --  --    GERD (gastroesophageal reflux disease) --  --    Hyperlipidemia --  --    Hypertension, Benign Essential --  --          Past Surgical History  Procedure Name Date Notes   *Denies any surgical procedures --  --          Medication List  Name Date Started Instructions   atorvastatin 20 mg oral tablet 05/14/2019 TAKE ONE TABLET BY MOUTH ONCE DAILY   lisinopril 40 mg oral tablet 05/14/2019 take 1 tablet (40 mg) by oral route once daily for 90 days   omeprazole 20 mg oral capsule,delayed release(DR/EC)  take 1 capsule (20 mg) by oral route once daily before a meal   tramadol 50 mg oral tablet 05/14/2019 take 2 takes Q am PO, then 1 tab Q evening for OA pain         Allergy List  Allergen Name Date Reaction Notes   Neosporin --  --  --          Family Medical History  Disease Name Relative/Age Notes   Arthrtis Mother/   Mother         Social History  Finding Status Start/Stop Quantity Notes   Alcohol Former --/-- --  drank alcohol in the past, 7 or less drinks per week   Exercises regularly --  --/--  "--  0 times per week   Recreational Drug Use Never --/-- --  never used   Second hand smoke exposure Unknown --/-- --  no   Tobacco Former --/-- 0.5 PPD former smoker, smokes less than 1 pack per day, for 10 years, never uses other tobacco products   Uses seatbelts --  --/-- --  no         Vitals  Date Time BP Position Site L\R Cuff Size HR RR TEMP (F) WT  HT  BMI kg/m2 BSA m2 O2 Sat        05/14/2019 01:43 /92 Sitting    114 - R  97.6 273lbs 6oz 5'  6\" 44.12 2.4 97 %          Physical Examination  · Constitutional  o Appearance  o : well developed, well-nourished, in no acute distress  · Eyes  o Conjunctivae  o : conjunctivae normal  · Neck  o Thyroid  o : no thyromegaly  · Respiratory  o Respiratory Effort  o : breathing unlabored  o Auscultation of Lungs  o : clear to ascultation  · Cardiovascular  o Heart  o :   § Auscultation of Heart  § : regular rate and rhythm  o Peripheral Vascular System  o :   § Extremities  § : no edema  · Musculoskeletal  o General  o :   § General Musculoskeletal  § : Muscle tone, strength, and development grossly normal.  · Skin and Subcutaneous Tissue  o General Inspection  o : a little rash on her arms from the sun  · Neurologic  o Gait and Station  o :   § Gait Screening  § : normal gait  · Psychiatric  o Mood and Affect  o : mood normal, affect appropriate          Assessment  · Hyperlipidemia     272.4/E78.5  · Hypertension, Benign Essential     401.1/I10  · Screening breast examination     V76.10/Z12.31  · Immunization due     V05.9/Z23      Plan  · Orders  o CMP [facility] (94502) - 401.1/I10 - 05/14/2019  o Lipid Panel [facility] (29667) - 401.1/I10 - 05/14/2019  o ACO-39: Current medications updated and reviewed () - - 05/14/2019  o Mammogram breast screening 2D digital bilateral. (, 05243) - - 05/14/2019  o [facility] (95410) - - 05/14/2019   Is there a way to find out what the price is for her?  · Medications  o atorvastatin 20 mg oral tablet   SIG: TAKE ONE TABLET BY MOUTH " ONCE DAILY   DISP: (90) Tablet with 1 refills  Adjusted on 05/14/2019     o lisinopril 40 mg oral tablet   SIG: take 1 tablet (40 mg) by oral route once daily for 90 days   DISP: (90) tablets with 1 refills  Adjusted on 05/14/2019     o tramadol 50 mg oral tablet   SIG: take 2 takes Q am PO, then 1 tab Q evening for OA pain   DISP: (90) tablet with 5 refills  Adjusted on 05/14/2019     · Instructions  o Advised that cheeses and other sources of dairy fats, animal fats, fast food, and the extras (candy, pasteries, pies, doughnuts and cookies) all contain LDL raising nutrients. Advised to increase fruits, vegetables, whole grains, and to monitor portion sizes.   o Patient was educated/instructed on their diagnosis, treatment and medications prior to discharge from the clinic today.            Electronically Signed by: Kathrin Loera MD -Author on May 14, 2019 02:11:28 PM

## 2021-05-07 NOTE — PROGRESS NOTES
Progress Note      Patient Name: Marija Burgos   Patient ID: [id number]   Sex: Female   YOB: 1960        Visit Date: April 24, 2018    Provider: Fahad Zuniga MD   Location: [facility]   Location Address: [address] [place] [address]  [id number]   Location Phone: [phone]          Chief Complaint     patient c/o shingles on her back x 2 days       History Of Present Illness  Marija Burgos is a 58 year old /White female who presents for evaluation and treatment of:      left chest wall pain with shingles x 2 days- sudden onset- worsening symptoms       Past Medical History  Disease Name Date Onset Notes   Arthritis --  --    GERD (gastroesophageal reflux disease) --  --    Hyperlipidemia --  --    Hypertension, Benign Essential --  --          Past Surgical History  Procedure Name Date Notes   *Denies any surgical procedures --  --          Medication List  Name Date Started Instructions   atorvastatin 20 mg oral tablet 02/06/2018 take 1 tablet (20 mg) by oral route once daily for 90 days   lisinopril-hydrochlorothiazide 20-12.5 mg oral tablet 02/06/2018 take 1 tablet by oral route once daily for 90 days   omeprazole 20 mg oral capsule,delayed release(DR/EC)  take 1 capsule (20 mg) by oral route once daily before a meal   tramadol 50 mg oral tablet 02/06/2018 take 2 tablets by oral route daily for 30 days in AM and 1 in PM         Allergy List  Allergen Name Date Reaction Notes   Neosporin --  --  --          Family Medical History  Disease Name Relative/Age Notes   Arthrtis / Mother    Mother/          Social History  Finding Status Start/Stop Quantity Notes   Alcohol Former --/-- --  drank alcohol in the past, 7 or less drinks per week   Exercises regularly --  --/-- --  0 times per week   Recreational Drug Use Never --/-- --  never used   Second hand smoke exposure Unknown --/-- --  no   Tobacco Former --/-- 0.5 PPD former smoker, smokes less than 1  pack per day, for 10 years, never uses other tobacco products   Uses seatbelts --  --/-- --  no         Review of Systems  · Constitutional  o Denies  o : fatigue, fever  · Cardiovascular  o Denies  o : chest pain, palpitations  · Respiratory  o Denies  o : shortness of breath, cough  · Gastrointestinal  o Denies  o : nausea, vomiting, diarrhea  · Integument  o Admits  o : rash      Vitals  Date Time BP Position Site L\R Cuff Size HR RR TEMP(F) WT  HT  BMI kg/m2 BSA m2 O2 Sat        04/24/2018 06:20 /82 Sitting    116 - R  97.2 278lbs 0oz    98 %           Physical Examination  · Constitutional  o Appearance  o : well developed, well-nourished, in no acute distress  · Respiratory  o Respiratory Effort  o : breathing unlabored  o Auscultation of Lungs  o : clear to ascultation  · Cardiovascular  o Heart  o :   § Auscultation of Heart  § : regular rate and rhythm  · Musculoskeletal  o General  o :   § General Musculoskeletal  § : No joint swelling or deformity., Muscle tone, strength, and development grossly normal.  · Skin and Subcutaneous Tissue  o General Inspection  o : vesicular rash following dermatome left posterior chest wall  · Neurologic  o Mental Status Examination  o :   § Orientation  § : grossly oriented to person, place and time  o Gait and Station  o :   § Gait Screening  § : normal gait              Assessment  · Shingles     053.9/B02.9      Plan  · Orders  o ACO-39: Current medications updated and reviewed () - - 04/24/2018  · Medications  o acyclovir 800 mg oral tablet   SIG: take 1 tablet (800 mg) by oral route 5 times per day for 7 days   DISP: (35) tablets with 0 refills  Prescribed on 04/24/2018     o Neurontin 100 mg oral capsule   SIG: take 1 capsule by oral route 3 times a day as needed for 7 days pain   DISP: (21) capsules with 0 refills  Prescribed on 04/24/2018     · Instructions  o Patient was educated/instructed on their diagnosis, treatment and medications prior to  discharge from the clinic today.            Electronically Signed by: Fahad Zuniga MD -Author on April 24, 2018 06:41:16 PM

## 2021-05-07 NOTE — PROGRESS NOTES
Progress Note      Patient Name: Marija Burgos   Patient ID: [id number]   Sex: Female   YOB: 1960    Primary Care Provider: Michelle VERNON   Referring Provider: Michelle VERNON    Visit Date: January 19, 2021    Provider: SAULO Mccann   [place] [facility]   Location Address: [address] [place] [address] [place]  [id number]   Location Phone: [phone]          History Of Present Illness  Video Conferencing Visit  Marija Burgos is a 60 year old /White female who is presenting for evaluation via video conferencing via [employer]. Verbal consent obtained before beginning visit.   The following staff were present during this visit: SAULO Mccann   TELEHEALTH TELEPHONE VISIT  Chief Complaint: discuss labs   Past Medical History/Overview of Patient Symptoms     follow up on labs - received letter in November - needs to come in and get repeat CBC and LFTs d/t elevated platelet count and alkaline phosphatase    She saw nephrology yesterday - Dr. Nance - he told her that there wasn't any protein in her urine -he wanted her to start a low-sodium diet, and follow up with him in 2 months - he reportedly checked her vitamin D and it was low, but she isn't sure if he is replacing it or if I am to replace it - he was not clear to her on that.     She is intolerant to statins, but she has never tried Zetia for HLD - she is willing to try it.          Past Medical History  Disease Name Date Onset Notes   GERD (gastroesophageal reflux disease) --  --    High risk medications (not anticoagulants) long-term use 05/12/2020 --    Hyperlipidemia --  --    Hypertension, Benign Essential --  --    Osteoarthritis 05/12/2020 --    Stage III chronic kidney disease 05/12/2020 --    Statin not tolerated 05/12/2020 --          Past Surgical History  Procedure Name Date Notes   Cholecystecomy 2020 --          Medication List  Name Date Started Instructions   ferrous  sulfate 325 mg (65 mg iron) oral tablet,delayed release (DR/EC) 11/25/2020 take 1 tablet by oral route daily   lisinopril 20 mg oral tablet 11/20/2020 take 1 tablet (20 mg) by oral route once daily   Nexium 20 mg oral capsule,delayed release(DR/EC)  take 1 capsule (20 mg) by oral route once daily at least 1 hour before a meal swallowing whole. Do not crush or chew granules.   sucralfate 1 gram oral tablet  take 1 tablet (1 gram) by oral route 4 times per day on an empty stomach 1 hour before meals and at bedtime   tramadol 50 mg oral tablet 11/20/2020 take 2 takes Q am PO, then 1 tab Q evening for OA pain         Allergy List  Allergen Name Date Reaction Notes   atorvastatin --  aches --    Neosporin --  --  --          Family Medical History  Disease Name Relative/Age Notes   Arthrtis Mother/   Mother         Social History  Finding Status Start/Stop Quantity Notes   Alcohol Former --/-- --  drank alcohol in the past, 7 or less drinks per week   Exercises regularly --  --/-- --  0 times per week   Recreational Drug Use Never --/-- --  never used   Second hand smoke exposure Unknown --/-- --  no   Tobacco Former --/-- 0.5 PPD former smoker, smokes less than 1 pack per day, for 10 years, never uses other tobacco products   Uses seatbelts --  --/-- --  no         Review of Systems  · Constitutional  o Admits  o : good general health lately  o Denies  o : fatigue, fever, chills, body aches  · Cardiovascular  o Denies  o : chest pain, syncope, dyspnea on exertion, lower extremity edema, lightheadedness, palpitations (fast, fluttering, or skipping beats)  · Respiratory  o Denies  o : shortness of breath  · Gastrointestinal  o Denies  o : nausea, vomiting, loss of appetite, abdominal pain  · Genitourinary  o Denies  o : dysuria, urinary retention, difficulty voiding  · Endocrine  o Denies  o : thyroid disease, diabetes, heat or cold intolerance, excessive thirst or urination      Physical  Examination  · Constitutional  o Appearance  o : well developed, well-nourished, in no acute distress  · Head and Face  o HEENT  o : Unremarkable  · Respiratory  o Respiratory Effort  o : breathing unlabored, no accessory muscle use  · Musculoskeletal  o General  o :   § General Musculoskeletal  § : Muscle tone, strength, and development grossly normal.  · Skin and Subcutaneous Tissue  o General Inspection  o : normal tone  · Neurologic  o Mental Status Examination  o :   § Orientation  § : grossly oriented to person, place and time  · Psychiatric  o General  o : normal affect and calm          Assessment  · Hyperlipidemia     272.4/E78.5  · Abnormal CBC     790.6/R79.89  · Elevated alkaline phosphatase level     790.5/R74.8  · Stage III chronic kidney disease     585.3/N18.30      Plan  · Orders  o ACO-39: Current medications updated and reviewed (1159F, ) - - 01/19/2021  · Medications  o Medications have been Reconciled  o Transition of Care or Provider Policy  · Instructions  o Advised that cheeses and other sources of dairy fats, animal fats, fast food, and the extras (candy, pastries, pies, doughnuts and cookies) all contain LDL raising nutrients. Advised to increase fruits, vegetables, whole grains, and to monitor portion sizes.   o Plan Of Care: Order for repeat CBC and LFTs is on the chart -states she will stop by the office today for the labs - will request office notes from Dr. Nance's office for recent visit - will RX Zetia if LFTs look good on this most recent panel.   o Chronic conditions reviewed and taken into consideration for today's treatment plan.            Electronically Signed by: SAULO Mccann -Author on January 19, 2021 11:01:16 AM

## 2021-05-07 NOTE — PROGRESS NOTES
Progress Note      Patient Name: Marija Burgos   Patient ID: [id number]   Sex: Female   YOB: 1960        Visit Date: February 6, 2018    Provider: Kathrin Loera MD   Location: [facility]   Location Address: [address] [place] [address]  [id number]   Location Phone: [phone]          Chief Complaint     Refills       History Of Present Illness  Marija Burgos is a 57 year old /White female who presents for evaluation and treatment of:      She is getting the medication for GERD over the counter because of the insurance hassle.  She is doing ok with Prilosec OTC.  She will come back for labs.  She is working in [place] at [employer] [address].  She has been taking Ultram for her arthritis.  She takes them TID 2 in am and another one in the day.  She does a lot of heavy lifting and it hurts in her hips and lower back.  she did PT at [facility] but I am going to give her the low back exercises again.  She doesn't get a lot of spasm in her back.  She gets muscle spasm at night in her calves and thighs.  She drinks a lot of water.       Past Medical History  Disease Name Date Onset Notes   Arthritis --  --    GERD (gastroesophageal reflux disease) --  --    Hyperlipidemia --  --    Hypertension, Benign Essential --  --          Past Surgical History  Procedure Name Date Notes   *Denies any surgical procedures --  --          Medication List  Name Date Started Instructions   atorvastatin 20 mg oral tablet  take 1 tablet (20 mg) by oral route once daily   lisinopril-hydrochlorothiazide 20-12.5 mg oral tablet  take 1 tablet by oral route once daily   omeprazole 20 mg oral capsule,delayed release(DR/EC)  take 1 capsule (20 mg) by oral route once daily before a meal   tramadol 50 mg oral tablet  --          Allergy List  Allergen Name Date Reaction Notes   Neosporin --  --  --          Family Medical History  Disease Name Relative/Age Notes   Arthrtis / Mother     "Mother/          Social History  Finding Status Start/Stop Quantity Notes   Alcohol Former --/-- --  drank alcohol in the past, 7 or less drinks per week   Exercises regularly --  --/-- --  0 times per week   Recreational Drug Use Never --/-- --  never used   Second hand smoke exposure Unknown --/-- --  no   Tobacco Former --/-- 0.5 PPD former smoker, smokes less than 1 pack per day, for 10 years, never uses other tobacco products   Uses seatbelts --  --/-- --  no         Vitals  Date Time BP Position Site L\R Cuff Size HR RR TEMP(F) WT  HT  BMI kg/m2 BSA m2 O2 Sat HC       02/06/2018 04:16 /72 Sitting    123 - R  97.2 280lbs 8oz 5'  7\" 43.93 2.45 97 %           Physical Examination  · Constitutional  o Appearance  o : well developed, well-nourished, in no acute distress  · Eyes  o Conjunctivae  o : conjunctivae normal  · Neck  o Inspection/Palpation  o : supple  o Thyroid  o : no thyromegaly  · Respiratory  o Respiratory Effort  o : breathing unlabored  o Auscultation of Lungs  o : clear to ascultation  · Cardiovascular  o Heart  o :   § Auscultation of Heart  § : regular rate and rhythm  o Peripheral Vascular System  o :   § Extremities  § : no edema  · Lymphatic  o Neck  o : no lymphadenopathy present  · Musculoskeletal  o General  o :   § General Musculoskeletal  § : No joint swelling or deformity., Muscle tone, strength, and development grossly normal.  · Skin and Subcutaneous Tissue  o General Inspection  o : no lesions present, no areas of discoloration, skin turgor normal, texture normal  · Neurologic  o Gait and Station  o :   § Gait Screening  § : normal gait  · Psychiatric  o Mood and Affect  o : mood normal, affect appropriate          Assessment  · Visit for screening mammogram     V76.12/Z12.31  · Essential hypertension     401.9/I10  · Hyperlipidemia     272.4/E78.5  · Osteoarthritis     715.90/M19.90      Plan  · Orders  o Screening Mammogram 2D Bilateral (72723, ) - V76.12/Z12.31 - " 02/06/2018   works in [place] It would be best to send her to [facility]  o CMP [facility] ([id number]) - - 02/06/2018  o Lipid Panel [facility] ([id number]) - 401.9/I10, 272.4/E78.5 - 02/06/2018  o ACO-39: Current medications updated and reviewed () - - 02/06/2018  o Fecal Occult Blood Diagnostic (Send to Lab) [facility] ([id number]) - - 02/06/2018  · Medications  o atorvastatin 20 mg oral tablet   SIG: take 1 tablet (20 mg) by oral route once daily for 90 days   DISP: (90) tablet with 1 refills  Prescribed on 02/06/2018     o lisinopril-hydrochlorothiazide 20-12.5 mg oral tablet   SIG: take 1 tablet by oral route once daily for 90 days   DISP: (90) tablet with 1 refills  Prescribed on 02/06/2018     o tramadol 50 mg oral tablet   SIG: take 2 tablets by oral route daily for 30 days in AM and 1 in PM   DISP: (60) tablet with 5 refills  Prescribed on 02/06/2018     · Instructions  o Advised that cheeses and other sources of dairy fats, animal fats, fast food, and the extras (candy, pasteries, pies, doughnuts and cookies) all contain LDL raising nutrients. Advised to increase fruits, vegetables, whole grains, and to monitor portion sizes.   o Patient was educated/instructed on their diagnosis, treatment and medications prior to discharge from the clinic today.                  Electronically Signed by: Kathrin Loera MD -Author on February 16, 2018 10:53:33 AM

## 2021-05-07 NOTE — PROGRESS NOTES
"   Progress Note      Patient Name: Marija Burgos   Patient ID: [id number]   Sex: Female   YOB: 1960        Visit Date: May 2, 2018    Provider: SAULO Mccann   Location: [facility]   Location Address: [address] [place] [address]  [id number]   Location Phone: [phone]          Chief Complaint     needs a note after shingles visit last week stating she is no longer contagious for work.       History Of Present Illness  Marija Burgos is a 58 year old /White female who presents for evaluation and treatment of:      follow up on shingles.     She was diagnosed on 4/24 with shingles--given acyclovir for 7 days--she has 2 pills left. Rash is improving, but not resolved. Still has some \"blisters\"--but most is drying up/scabbing over. She still has a burning pain/sensation. Wants a note to return to work.       Past Medical History  Disease Name Date Onset Notes   Arthritis --  --    GERD (gastroesophageal reflux disease) --  --    Hyperlipidemia --  --    Hypertension, Benign Essential --  --          Past Surgical History  Procedure Name Date Notes   *Denies any surgical procedures --  --          Medication List  Name Date Started Instructions   atorvastatin 20 mg oral tablet 02/06/2018 take 1 tablet (20 mg) by oral route once daily for 90 days   lisinopril-hydrochlorothiazide 20-12.5 mg oral tablet 02/06/2018 take 1 tablet by oral route once daily for 90 days   Neurontin 100 mg oral capsule 04/24/2018 take 1 capsule by oral route 3 times a day as needed for 7 days pain   omeprazole 20 mg oral capsule,delayed release(DR/EC)  take 1 capsule (20 mg) by oral route once daily before a meal   tramadol 50 mg oral tablet 02/06/2018 take 2 tablets by oral route daily for 30 days in AM and 1 in PM         Allergy List  Allergen Name Date Reaction Notes   Neosporin --  --  --          Family Medical History  Disease Name Relative/Age Notes   Arthrtis / Mother    " Mother/          Social History  Finding Status Start/Stop Quantity Notes   Alcohol Former --/-- --  drank alcohol in the past, 7 or less drinks per week   Exercises regularly --  --/-- --  0 times per week   Recreational Drug Use Never --/-- --  never used   Second hand smoke exposure Unknown --/-- --  no   Tobacco Former --/-- 0.5 PPD former smoker, smokes less than 1 pack per day, for 10 years, never uses other tobacco products   Uses seatbelts --  --/-- --  no         Review of Systems  · Constitutional  o Admits  o : good general health lately  · Cardiovascular  o Admits  o : chest wall pain  o Denies  o : chest pain, irregular heart beats, syncope, dyspnea on exertion  · Respiratory  o Denies  o : shortness of breath, wheezing, cough  · Gastrointestinal  o Denies  o : nausea, vomiting, diarrhea, abdominal pain  · Integument  o Admits  o : rash, itching  o Denies  o : new skin lesions  · Neurologic  o Admits  o : tingling or numbness      Vitals  Date Time BP Position Site L\R Cuff Size HR RR TEMP(F) WT  HT  BMI kg/m2 BSA m2 O2 Sat        05/02/2018 02:34 /80 Sitting    120 - R  97.6 276lbs 6oz    98 %           Physical Examination  · Constitutional  o Appearance  o : well developed, well-nourished, in no acute distress  · Respiratory  o Respiratory Effort  o : breathing unlabored  o Auscultation of Lungs  o : clear to ascultation  · Cardiovascular  o Heart  o :   § Auscultation of Heart  § : regular rate and rhythm  o Peripheral Vascular System  o :   § Extremities  § : no edema  · Musculoskeletal  o General  o :   § General Musculoskeletal  § : No joint swelling or deformity. Muscle tone, strength, and development grossly normal.  · Skin and Subcutaneous Tissue  o General Inspection  o : vesicular rash from midline abdomen to midline spine on the left--few scattered vesicles remaining, but otherwise appears to be drying up  · Neurologic  o Gait and Station  o :   § Gait Screening  § : normal  gait  · Psychiatric  o Mood and Affect  o : mood normal, affect appropriate              Assessment  · Shingles     053.9/B02.9      Plan  · Orders  o ACO-39: Current medications updated and reviewed () - - 05/02/2018  · Instructions  o Patient was educated/instructed on their diagnosis, treatment and medications prior to discharge from the clinic today. She will finish the acyclovir--discussed topical treatment options; however, she states she is so sensitive and allergic to medications that she would rather not try anything else. She will return to work on Friday. Call or RTC with any problems. She is going to inquire about the Shingles vaccine--it is not free with her insurance until she is 61, so she is going to check for OOP price.   · Disposition  o Call or Return if symptoms worsen or persist.            Electronically Signed by: SAULO Mccann -Author on May 2, 2018 02:59:17 PM

## 2021-05-07 NOTE — PROGRESS NOTES
"   Progress Note      Patient Name: Marija Burgos   Patient ID: [id number]   Sex: Female   YOB: 1960    Primary Care Provider: Michelle VERNON   Referring Provider: Michelle VERNON    Visit Date: November 20, 2020    Provider: SAULO Mccann   Location: [facility]   Location Address: [address] [place] [address]   [place]  [id number]   Location Phone: [phone]          Chief Complaint     refills       History Of Present Illness  Marija Burgos is a 60 year old /White female who presents for evaluation and treatment of:      follow up on chronic health conditions and medication refills     HTN - at the time of her last appointment we took her lisinopril down to 20mg - she is checking her BP at home and it is running \"great\" - she was recently hospitalized for acute cholecystitis and had laporoscopic CCX - she didn't even get BP medications while in the hospital - No c/o chest pain, palpitations, headaches, dizziness, shortness of breath, or swelling in the legs. She does not have ANDRZEJ.     Iron deficiency - took iron for 3 months - now out - no issues with constipation with iron     Chronic pain - mainly in the bilateral hips and related to OA - she has been known to ache all over if she is out of the medication - she has hx of stage III CKD - she has been taking Tramadol for many years now - doing well with it and it does control her pain - she may have more pain on days where she works harder, but for the most part her pain averages a 3 on 1-10 scale. Without the medication the pain is an 8 on 1-10. She is prescribed 3 pills per day - she generally takes 3 on the days that she works, but sometimes only one on an off day. She was prescribed pain medication by Dr. Esquivel post-op, but she is no longer taking that.       Past Medical History  Disease Name Date Onset Notes   GERD (gastroesophageal reflux disease) --  --    High risk medications (not " anticoagulants) long-term use 05/12/2020 --    Hyperlipidemia --  --    Hypertension, Benign Essential --  --    Osteoarthritis 05/12/2020 --    Stage III chronic kidney disease 05/12/2020 --    Statin not tolerated 05/12/2020 --          Past Surgical History  Procedure Name Date Notes   Cholecystecomy 2020 --          Medication List  Name Date Started Instructions   Colace 100 mg oral capsule 10/27/2020 take 1 capsule (100 mg) by oral route 2 times per day for 5 days   ferrous sulfate 325 mg (65 mg iron) oral tablet,delayed release (DR/EC) 08/13/2020 take 1 tablet by oral route daily   lisinopril 40 mg oral tablet 05/12/2020 take 1 tablet (40 mg) by oral route once daily for 90 days   Nexium 20 mg oral capsule,delayed release(DR/EC)  take 1 capsule (20 mg) by oral route once daily at least 1 hour before a meal swallowing whole. Do not crush or chew granules.   Norco 5-325 mg oral tablet 10/27/2020 take 1 tablet by oral route every 4 hours as needed for pain   ondansetron 4 mg oral tablet,disintegrating 10/27/2020 place 1 tablet (4 mg) on top of the tongue where they will dissolve, then swallow by translingual route every 4 hours as needed for nausea   sucralfate 1 gram oral tablet  take 1 tablet (1 gram) by oral route 4 times per day on an empty stomach 1 hour before meals and at bedtime   tramadol 50 mg oral tablet 08/12/2020 take 2 takes Q am PO, then 1 tab Q evening for OA pain         Allergy List  Allergen Name Date Reaction Notes   atorvastatin --  aches --    Neosporin --  --  --        Allergies Reconciled  Family Medical History  Disease Name Relative/Age Notes   Arthrtis Mother/   Mother         Social History  Finding Status Start/Stop Quantity Notes   Alcohol Former --/-- --  drank alcohol in the past, 7 or less drinks per week   Exercises regularly --  --/-- --  0 times per week   Recreational Drug Use Never --/-- --  never used   Second hand smoke exposure Unknown --/-- --  no   Tobacco Former --/--  0.5 PPD former smoker, smokes less than 1 pack per day, for 10 years, never uses other tobacco products   Uses seatbelts --  --/-- --  no         Review of Systems  · Constitutional  o Admits  o : good general health lately  o Denies  o : fever, chills, body aches  · HENT  o Denies  o : headaches, vertigo, lightheadedness  · Cardiovascular  o Denies  o : chest pain, syncope, dyspnea on exertion, lower extremity edema, lightheadedness, palpitations  · Respiratory  o Denies  o : shortness of breath, cough  · Gastrointestinal  o Admits  o : recent CCX - had bile leak post-op; has drain  o Denies  o : nausea, vomiting, diarrhea, constipation, abdominal pain  · Genitourinary  o Denies  o : dysuria, urinary retention  · Neurologic  o Denies  o : dizziness  · Musculoskeletal  o Admits  o : joint pain, hip pain  · Endocrine  o Denies  o : polyuria, polydipsia, cold intolerance, heat intolerance      Vitals  Date Time BP Position Site L\R Cuff Size HR RR TEMP (F) WT  HT  BMI kg/m2 BSA m2 O2 Sat FR L/min FiO2 HC       11/20/2020 01:27 /80 Sitting    94 - R  97.5 248lbs 0oz    96 %            Physical Examination  · Constitutional  o Appearance  o : well developed, well-nourished, in no acute distress  · Eyes  o Conjunctivae  o : conjunctivae normal, no exudates present  · Neck  o Inspection/Palpation  o : normal appearance, no masses or tenderness, trachea midline  o Thyroid  o : no thyromegaly  · Respiratory  o Respiratory Effort  o : breathing unlabored  o Auscultation of Lungs  o : clear to auscultation  · Cardiovascular  o Heart  o :   § Auscultation of Heart  § : regular rate, normal rhythm, no murmurs present  o Peripheral Vascular System  o :   § Carotid Arteries  § : normal pulses bilaterally, no bruits present  § Pedal Pulses  § : bilateral pulse strength 2+  § Extremities  § : no edema  · Lymphatic  o Neck  o : no lymphadenopathy present  · Musculoskeletal  o General  o :   § General Musculoskeletal  § :  Muscle tone, strength, and development grossly normal.  · Skin and Subcutaneous Tissue  o General Inspection  o : no lesions present, no areas of discoloration, skin turgor normal, texture normal  · Neurologic  o Gait and Station  o :   § Gait Screening  § : normal gait  · Psychiatric  o Mood and Affect  o : mood normal, affect appropriate          Assessment  · Essential hypertension     401.9/I10  · Hyperlipidemia     272.4/E78.5  · Osteoarthritis     715.90/M19.90  · High risk medications (not anticoagulants) long-term use     V58.69/Z79.899  · Stage III chronic kidney disease     585.3/N18.3  · Statin not tolerated     995.27/Z78.9  · Iron deficiency     280.9/E61.1      Plan  · Orders  o CBC with Auto Diff [facility] ([id number]) - 401.9/I10, 272.4/E78.5, 585.3/N18.3 - 11/20/2020  o CMP [facility] (15301) - 401.9/I10, 272.4/E78.5, 585.3/N18.3 - 11/20/2020  o Lipid Panel [facility] (49063) - 401.9/I10, 272.4/E78.5 - 11/20/2020  o Urinalysis with Reflex Microscopy if abnormal ([facility]) (53776) - 401.9/I10 - 11/20/2020  o Urine Drug Screen ([facility]) (64156) - V58.69/Z79.899 - 11/20/2020  o Urine microalbumin (04214) - 401.9/I10 - 11/20/2020  o ACO-39: Current medications updated and reviewed (1159F, ) - - 11/20/2020  o Iron Profile (Iron 38185 TIBC 67970 and Transferrin 59971) (IRONP) - 280.9/E61.1 - 11/20/2020  o Ferritin ser/plas (05855) - 280.9/E61.1 - 11/20/2020  · Medications  o tramadol 50 mg oral tablet   SIG: take 2 takes Q am PO, then 1 tab Q evening for OA pain   DISP: (90) Tablet with 2 refills  Refilled on 11/20/2020     o Medications have been Reconciled  o Transition of Care or Provider Policy  · Instructions  o Recommended exercise program to assist with cholesterol, weight loss and overall health improvement.  o Advised that cheeses and other sources of dairy fats, animal fats, fast food, and the extras (candy, pasteries, pies, doughnuts and cookies) all contain LDL raising nutrients. Advised to increase fruits, vegetables,  whole grains, and to monitor portion sizes.   o Obtained a written consent for STEPHANIE query. Discussed the risk and benefits of the use of controlled substances with the patient, including the risk of tolerance and drug dependence. The patient has been counseled on the need to have an exit strategy, including potentially discontinuing the use of controlled substances. STEPHANIE has or will be reviewed as soon as it becomes avaliable.  o Handouts were given to patient: CS Education  o Take all medications as prescribed/directed.  o Patient was educated/instructed on their diagnosis, treatment and medications prior to discharge from the clinic today. she has plenty of Lisinopril since she has been cutting her 40mg tablet in half. BP is stable and the dizziness/low BP issues have resolved. Refill Tramadol. She will RTC tomorrow for fasting labs.   o Chronic conditions reviewed and taken into consideration for today's treatment plan.  o Electronically Identified Patient Education Materials Provided Electronically  · Disposition  o Call or Return if symptoms worsen or persist.            Electronically Signed by: SAULO Mccann -Author on November 20, 2020 01:49:53 PM

## 2021-05-07 NOTE — PROGRESS NOTES
"   Progress Note      Patient Name: Marija Burgos   Patient ID: [id number]   Sex: Female   YOB: 1960    Primary Care Provider: Michelle VERNON   Referring Provider: Michelle VERNON    Visit Date: February 13, 2020    Provider: SAULO Mccann   Location: [facility]   Location Address: [address] [place] [address] [place]  [address]   Location Phone: [phone]          Chief Complaint     Medication refills       History Of Present Illness  Marija Burgos is a 59 year old /White female who presents for evaluation and treatment of:      follow up on chronic health conditions and medication refills.     She has been to GI - she has had EGD and it showed \"inflammation of my stomach\", and a hiatal hernia and pyloric stenosis - she will see Dr. Villarreal on March 20th - she did a Cologuard for them in [facility] and it was negative. She has a follow up in [facility] in April/May. Heartburn is way better with stopping caffeine. She is taking Protonix 40mg BID.     HTN - taking Lisinopril daily and denies any trouble with it.     PHQ-9 score is 2 - she attributes her scores to not being able to drink her caffeine and food aversion d/t pyloric stenosis.     OA of bilateral hips in the setting of stage III CKD - she has taken this for several years - started by Dr. Jack - when he was across from "[employer] and then continued by SAULO Clancy and Dr. Loera. She is taking 50mg TID - she takes 2 QAM when she works, and then 1 at night - when she is off she will only require one 50mg tablet. She is satisfied with pain control. She denies any s/s of abuse or addiction.    Declines mammogram - last one was in 2009       Past Medical History  Disease Name Date Onset Notes   Arthritis --  --    GERD (gastroesophageal reflux disease) --  --    Hyperlipidemia --  --    Hypertension, Benign Essential --  --          Past Surgical History  Procedure Name Date Notes "   *Denies any surgical procedures --  --          Medication List  Name Date Started Instructions   Crestor 5 mg oral tablet 02/25/2020 take 1 tablet (5 mg) by oral route once daily at bedtime   lisinopril 40 mg oral tablet 11/11/2019 take 1 tablet (40 mg) by oral route once daily for 90 days   Protonix 40 mg oral tablet,delayed release (DR/EC) 02/13/2020 take 1 tablet (40 mg) by oral route once daily   tramadol 50 mg oral tablet 02/13/2020 take 2 takes Q am PO, then 1 tab Q evening for OA pain         Allergy List  Allergen Name Date Reaction Notes   atorvastatin --  aches --    Neosporin --  --  --          Family Medical History  Disease Name Relative/Age Notes   Arthrtis Mother/   Mother         Social History  Finding Status Start/Stop Quantity Notes   Alcohol Former --/-- --  drank alcohol in the past, 7 or less drinks per week   Exercises regularly --  --/-- --  0 times per week   Recreational Drug Use Never --/-- --  never used   Second hand smoke exposure Unknown --/-- --  no   Tobacco Former --/-- 0.5 PPD former smoker, smokes less than 1 pack per day, for 10 years, never uses other tobacco products   Uses seatbelts --  --/-- --  no         Review of Systems  · Constitutional  o Admits  o : fatigue, good general health lately  o Denies  o : fever, chills  · Eyes  o Denies  o : blurred vision, changes in vision  · HENT  o Denies  o : headaches, vertigo, lightheadedness  · Cardiovascular  o Denies  o : chest pain, dyspnea on exertion, lower extremity edema, palpitations  · Respiratory  o Denies  o : shortness of breath, wheezing, cough  · Gastrointestinal  o Admits  o : heartburn, reflux  o Denies  o : nausea, vomiting, diarrhea, constipation, dysphagia, abdominal pain  · Genitourinary  o Denies  o : dysuria, urinary retention  · Integument  o Denies  o : pigmentation changes  · Neurologic  o Denies  o : tingling or numbness, dizziness  · Musculoskeletal  o Admits  o : joint pain, hip  pain  · Endocrine  o Denies  o : polyuria, polydipsia, cold intolerance, heat intolerance  · Psychiatric  o Denies  o : anxiety, depression, difficulty sleeping, suicidal ideation, homicidal ideation      Vitals  Date Time BP Position Site L\R Cuff Size HR RR TEMP (F) WT  HT  BMI kg/m2 BSA m2 O2 Sat HC       02/13/2020 02:55 /84 Sitting    107 - R  97.8 260lbs 4oz    98 %          Physical Examination  · Constitutional  o Appearance  o : well developed, well-nourished, in no acute distress  · Eyes  o Conjunctivae  o : conjunctivae normal, no exudates present  · Neck  o Inspection/Palpation  o : normal appearance, no masses or tenderness, trachea midline  o Thyroid  o : no thyromegaly  · Respiratory  o Respiratory Effort  o : breathing unlabored  o Auscultation of Lungs  o : clear to ascultation  · Cardiovascular  o Heart  o :   § Auscultation of Heart  § : regular rate, normal rhythm, no murmurs present  o Peripheral Vascular System  o :   § Carotid Arteries  § : normal pulses bilaterally, no bruits present  § Pedal Pulses  § : bilateral pulse strength 2+  § Extremities  § : no edema  · Gastrointestinal  o Abdominal Examination  o :   § Abdomen  § : soft, non-tender, non-distended, bowel sounds +  · Lymphatic  o Neck  o : no lymphadenopathy present  · Musculoskeletal  o General  o :   § General Musculoskeletal  § : Muscle tone, strength, and development grossly normal.  · Skin and Subcutaneous Tissue  o General Inspection  o : no lesions present, no areas of discoloration, skin turgor normal, texture normal  · Neurologic  o Gait and Station  o :   § Gait Screening  § : normal gait  · Psychiatric  o Mood and Affect  o : mood normal, affect appropriate          Assessment  · Essential hypertension     401.9/I10  · GERD (gastroesophageal reflux disease)     530.81/K21.9  · Hyperlipidemia     272.4/E78.5  · Osteoarthritis     715.90/M19.90  · Stage III chronic kidney disease     585.3/N18.3  · Mammogram  declined     V64.2/Z53.20      Plan  · Orders  o CMP [facility] ([id number]) - 401.9/I10, 272.4/E78.5, 585.3/N18.3 - 02/13/2020  o Lipid Panel [facility] (03963) - 272.4/E78.5 - 02/13/2020  o ACO-18: Negative screen for clinical depression using a standardized tool () - - 02/13/2020  o ACO-19: Colorectal cancer screening results documented and reviewed (3017F) - - 02/13/2020  o ACO-20: Screening Mammography documented and reviewed () - V64.2/Z53.20 - 02/13/2020  o ACO-39: Current medications updated and reviewed () - - 02/13/2020  · Medications  o Protonix 40 mg oral tablet,delayed release (DR/EC)   SIG: take 1 tablet (40 mg) by oral route once daily   DISP: (90) tablets with 0 refills  Refilled on 02/13/2020     o tramadol 50 mg oral tablet   SIG: take 2 takes Q am PO, then 1 tab Q evening for OA pain   DISP: (90) tablet with 2 refills  Refilled on 02/13/2020     o Medications have been Reconciled  o Transition of Care or Provider Policy  · Instructions  o Patient advised to monitor blood pressure (B/P) at home and journal readings. Patient informed that a B/P reading at home of more than 135/85 is considered hypertension. For readings greater jiad784/90 or higher patient is advised to follow up in the office with readings for management. Patient advised to limit sodium intake.  o Maintain a healthy weight. Avoid tight fitting clothes. Avoid fried, fatty foods, tomato sauce, chocolate, mint, garlic, onion, alcohol. caffeine. Eat smaller meals, dont lie down after a meal, dont smoke. Elevate the head of your bed 6-9 inches.  o Obtained a written consent for STEPHANIE query. Discussed the risk and benefits of the use of controlled substances with the patient, including the risk of tolerance and drug dependence. The patient has been counseled on the need to have an exit strategy, including potentially discontinuing the use of controlled substances. STEPHANIE has or will be reviewed as soon as it becomes avaliable.  o Take all  medications as prescribed/directed.  o Patient was educated/instructed on their diagnosis, treatment and medications prior to discharge from the clinic today.  o Chronic conditions reviewed and taken into consideration for today's treatment plan.  · Disposition  o Call or Return if symptoms worsen or persist.            Electronically Signed by: SAULO Mccann -Author on May 12, 2020 10:25:36 AM

## 2021-05-07 NOTE — PROGRESS NOTES
Quick Note      Patient Name: Marija Burgos   Patient ID: [id number]   Sex: Female   YOB: 1960    Primary Care Provider: Michelle VERNON   Referring Provider: Michelle VERNON    Visit Date: May 12, 2020    Provider: SAULO Mccann   Location: [facility]   Location Address: [address] [place] [address]   [place]  [id number]   Location Phone: [phone]          History Of Present Illness  Video Conferencing Visit  Marija Burgos is a 60 year old /White female who is presenting for evaluation via video conferencing. Verbal consent obtained before beginning visit.   The following staff were present during this visit: SAULO Mccann   Chief Complaint: medication refills   Past Medical History/Overview of Patient Symptoms     HTN - taking Lisinopril 40mg daily and denies any trouble with it. Denies any chest pain, palpitations, headaches, dizziness, shortness of breath or swelling in the legs.     HLD - she is no longer taking Crestor - was making her arms hurt really bad. She had similar effects with 2 other statins - one being atorvastatin. She has taken fish oil in the past and that didn't help bring it down either.     She was taking Protonix for heartburn but states that it didn't help at all so she went back to her Nexium and it has done pretty good so far. Since she has stopped caffiene almost entirely her heartburn has really improved. No nausea, vomiting, dysphagia, abdominal pain, or melena.     OA of bilateral hips in the setting of stage III CKD - she has taken this for several years - started by Dr. Jack - when he was across from [facility], and then continued by SAULO Clancy and Dr. Loera. She is taking 50mg TID - she takes 2 QAM when she works, and then 1 at night - when she is off she will only require one 50mg tablet. She is satisfied with pain control. She denies any s/s of abuse or addiction. Despite COVID-19 she  is still working. Works at [employer] in [place]. She works 40 hours per week.       Physical Examination  · Constitutional  o Appearance  o : well developed, well-nourished, in no acute distress  · Head and Face  o HEENT  o : Unremarkable  · Respiratory  o Respiratory Effort  o : breathing unlabored, no accessory muscle use  · Musculoskeletal  o General  o :   § General Musculoskeletal  § : Muscle tone, strength, and development grossly normal.  · Skin and Subcutaneous Tissue  o General Inspection  o : normal tone  · Neurologic  o Mental Status Examination  o :   § Orientation  § : grossly oriented to person, place and time  · Psychiatric  o General  o : normal affect and calm          Assessment  · GERD (gastroesophageal reflux disease)     530.81/K21.9  · Hyperlipidemia     272.4/E78.5  · Osteoarthritis     715.90/M19.90  · Hypertension, Benign Essential     401.1/I10  · High risk medications (not anticoagulants) long-term use     V58.69/Z79.899  · Stage III chronic kidney disease     585.3/N18.3  · Statin not tolerated     995.27/Z78.9    Problems Reconciled  Plan  · Orders  o ACO-42: Not currently on a statin or hasn't received an order for a statin due to documented medical reason () - 272.4/E78.5, 995.27/Z78.9 - 05/12/2020  o CBC with Auto Diff [facility] ([id number]) - 401.1/I10, 272.4/E78.5 - 05/12/2020  o CMP [facility] ([id number]) - 401.1/I10, 272.4/E78.5 - 05/12/2020  o Lipid Panel [facility] ([id number]) - 401.1/I10, 272.4/E78.5 - 05/12/2020  o STEPHANIE Report (KASPR) - - 05/12/2020  · Medications  o lisinopril 40 mg oral tablet   SIG: take 1 tablet (40 mg) by oral route once daily for 90 days   DISP: (90) tablets with 1 refills  Refilled on 05/12/2020     o tramadol 50 mg oral tablet   SIG: take 2 takes Q am PO, then 1 tab Q evening for OA pain   DISP: (90) tablet with 2 refills  Refilled on 05/12/2020     o Crestor 5 mg oral tablet   SIG: take 1 tablet (5 mg) by oral route once daily at bedtime   DISP: (30)  tablets with 2 refills  Discontinued on 05/12/2020     o Protonix 40 mg oral tablet,delayed release (DR/EC)   SIG: take 1 tablet (40 mg) by oral route once daily   DISP: (90) tablets with 0 refills  Discontinued on 05/12/2020     o Medications have been Reconciled  o Transition of Care or Provider Policy  · Instructions  o Plan Of Care: will get fasting labs later this week - refill lisinopril and tramadol today - she is getting Nexium OTC. If lipids are still abnormal, then we will consider Welchol or similar for treatment. Advised that it would need to be taken 4 hours or more after taking routine medications. will also get records from Dr. Villarreal's office. Dr. Sahni had referred.   o Chronic conditions reviewed and taken into consideration for today's treatment plan.  o Patient is taking medications as prescribed and doing well.   o Call the office with any concerns or questions.  o Electronically Identified Patient Education Materials Provided Electronically            Electronically Signed by: SAULO Mccann -Author on May 12, 2020 10:46:40 AM

## 2021-05-07 NOTE — PROGRESS NOTES
"   Progress Note      Patient Name: Marija Burgos   Patient ID: [id number]   Sex: Female   YOB: 1960        Visit Date: November 11, 2019    Provider: SAULO Mccann   Location: [facility]   Location Address: [address] [place] [address]   [place]  [address]   Location Phone: [phone]          Chief Complaint     refills  issues with stomach  discuss cholesterol med       History Of Present Illness  Marija Burgos is a 59 year old /White female who presents for evaluation and treatment of:      follow up on chronic health conditions and medication refills.     She states in the past 1 week she has had diarrhea -- for the past 5 days -- describes liquid stools. The stools are nocturnal primarily - starts at 1AM and she will move her bowels 5 times prior to getting up for the day - she has to get up 3AM - and then she stops. She did have an episode of diarrhea at work, but it was just the 1 BM and that was on Friday. She hasn't voided much in the past 2 days, so she thinks she is dehydrated. She denies rectal bleeding or blood in stool.     Her heartburn is horrible right now -- it has been for \"a long-long time\". She states she \"eats rolaids\" all day long. She also takes acid reflux medicine (Nexium OTC)-- she feels full early -- she can eat very little and feel full up to her neck.     She has never had a colonoscopy -- she did have a negative FOBT in 2/2018.     She states the atorvastatin was making her hurt all over -- she had only been taking it for a year or so; not a long time. She stopped taking it.     She takes Tramadol for her bilateral hips - osteoarthritis of the hips. She has taken it for several year; she can't recall how long. She states that \"they had me on different stuff but it didn't work\". She does have stage III chronic kidney disease. At one time she did see a nephrologist -- Tamra sent her there -- She states he told her she would live " "a long life and did not require her to follow up. It was [place]. She takes 50mg tablets -- 3 per day -- she will generally take 2 in AM and 1 in PM -- on her days off she may only take one, just depends on how bad she is hurting. This is the same regimen she has been on for some time.     HTN - she does not check her blood pressure at home. She comments that it is \"high today\". She takes 40mg of Lisinopril daily. She denies any trouble with swelling. No c/o chest pain or palpitations. she has had some dizziness this past week with the diarrhea, but otherwise not normally dizzy. No headaches. She has had some shortness of breath this past week as well, but not as a rule.       Past Medical History  Disease Name Date Onset Notes   Arthritis --  --    GERD (gastroesophageal reflux disease) --  --    Hyperlipidemia --  --    Hypertension, Benign Essential --  --          Past Surgical History  Procedure Name Date Notes   *Denies any surgical procedures --  --          Medication List  Name Date Started Instructions   lisinopril 40 mg oral tablet 05/14/2019 take 1 tablet (40 mg) by oral route once daily for 90 days   tramadol 50 mg oral tablet 05/14/2019 take 2 takes Q am PO, then 1 tab Q evening for OA pain         Allergy List  Allergen Name Date Reaction Notes   atorvastatin --  aches --    Neosporin --  --  --        Allergies Reconciled  Family Medical History  Disease Name Relative/Age Notes   Arthrtis Mother/   Mother         Social History  Finding Status Start/Stop Quantity Notes   Alcohol Former --/-- --  drank alcohol in the past, 7 or less drinks per week   Exercises regularly --  --/-- --  0 times per week   Recreational Drug Use Never --/-- --  never used   Second hand smoke exposure Unknown --/-- --  no   Tobacco Former --/-- 0.5 PPD former smoker, smokes less than 1 pack per day, for 10 years, never uses other tobacco products   Uses seatbelts --  --/-- --  no         Review of " "Systems  · Constitutional  o Admits  o : good general health lately  o Denies  o : fatigue, fever, chills  · Eyes  o Denies  o : changes in vision  · HENT  o Denies  o : headaches, vertigo, lightheadedness  · Cardiovascular  o Admits  o : dyspnea on exertion  o Denies  o : chest pain, syncope, lower extremity edema, palpitations  · Respiratory  o Admits  o : shortness of breath  o Denies  o : wheezing, cough  · Gastrointestinal  o Admits  o : diarrhea, heartburn, reflux, early satiety  o Denies  o : nausea, vomiting, constipation, dysphagia, abdominal pain, blood in stools, hematochezia  · Genitourinary  o Denies  o : dysuria, urinary retention  · Integument  o Denies  o : pigmentation changes  · Neurologic  o Admits  o : dizziness  o Denies  o : loss of balance  · Musculoskeletal  o Admits  o : joint pain, hip pain      Vitals  Date Time BP Position Site L\R Cuff Size HR RR TEMP (F) WT  HT  BMI kg/m2 BSA m2 O2 Sat HC       11/06/2018 04:18 /86 Sitting    102 - R  98.1 276lbs 0oz    98 %    05/14/2019 01:43 /92 Sitting    114 - R  97.6 273lbs 6oz 5'  6\" 44.12 2.4 97 %    11/11/2019 11:30 /90 Sitting    139 - R  97.7 262lbs 0oz    94 %          Physical Examination  · Constitutional  o Appearance  o : well developed, well-nourished, in no acute distress  · Eyes  o Conjunctivae  o : conjunctivae normal, no exudates present  · Neck  o Inspection/Palpation  o : normal appearance, no masses or tenderness, trachea midline  o Thyroid  o : no thyromegaly  · Respiratory  o Respiratory Effort  o : breathing unlabored  o Auscultation of Lungs  o : clear to ascultation  · Cardiovascular  o Heart  o :   § Auscultation of Heart  § : regular rate, normal rhythm, no murmurs present  o Peripheral Vascular System  o :   § Carotid Arteries  § : normal pulses bilaterally, no bruits present  § Pedal Pulses  § : bilateral pulse strength 2+  § Extremities  § : no edema  · Gastrointestinal  o Abdominal Examination  o : "   § Abdomen  § : soft, non-tender, non-distended, bowel sounds +; no appreciable hepatosplenomegaly - exam limited due to body habitus  · Lymphatic  o Neck  o : no lymphadenopathy present  · Musculoskeletal  o General  o :   § General Musculoskeletal  § : Muscle tone, strength, and development grossly normal.  · Skin and Subcutaneous Tissue  o General Inspection  o : no lesions present, no areas of discoloration, skin turgor normal, texture normal  · Neurologic  o Gait and Station  o :   § Gait Screening  § : normal gait  · Psychiatric  o Mood and Affect  o : mood normal, affect appropriate          Assessment  · Essential hypertension     401.9/I10  · GERD (gastroesophageal reflux disease)     530.81/K21.9  · Hyperlipidemia     272.4/E78.5  · Nocturnal diarrhea     787.91/R19.7  · Osteoarthritis of hips, bilateral     715.95/M16.0  · High risk medication use     V58.69/Z79.899  · Stage III chronic kidney disease     585.3/N18.3    Problems Reconciled  Plan  · Orders  o ACO-42: Not currently on a statin or hasn't received an order for a statin due to documented medical reason () - 272.4/E78.5 - 11/11/2019   myalgia  o CBC with Auto Diff [facility] ([id number]) - 787.91/R19.7, 530.81/K21.9 - 11/11/2019  o CMP [facility] ([id number]) - 787.91/R19.7, 530.81/K21.9 - 11/11/2019  o Lipid Panel [facility] ([id number]) - 272.4/E78.5 - 11/11/2019  o Thyroid Profile (THYII) - 787.91/R19.7, 401.9/I10 - 11/11/2019  o Urinalysis with Reflex Microscopy if abnormal ([facility]) ([id number]) - 401.9/I10 - 11/11/2019  o Urine Drug Screen ([facility]) ([id number]) - V58.69/Z79.899 - 11/11/2019  o Urine microalbumin (95669) - 401.9/I10 - 11/11/2019  o ACO-39: Current medications updated and reviewed () - - 11/11/2019  o GASTROENTEROLOGY ([facility]) - 787.91/R19.7, 530.81/K21.9 - 11/11/2019   [place]  o Amylase/Lipase Profile ([facility]) - 787.91/R19.7, 530.81/K21.9 - 11/11/2019  · Medications  o Protonix 40 mg oral tablet,delayed release (DR/EC)   SIG: take 1 tablet (40 mg) by oral route once  daily   DISP: (90) tablets with 0 refills  Prescribed on 11/11/2019     o tramadol 50 mg oral tablet   SIG: take 2 takes Q am PO, then 1 tab Q evening for OA pain   DISP: (90) tablet with 2 refills  Adjusted on 11/11/2019     o lisinopril 40 mg oral tablet   SIG: take 1 tablet (40 mg) by oral route once daily for 90 days   DISP: (90) tablets with 1 refills  Refilled on 11/11/2019     o omeprazole 20 mg oral capsule,delayed release(DR/EC)   SIG: take 1 capsule (20 mg) by oral route once daily before a meal   DISP: (0) capsule with 0 refills  Discontinued on 11/11/2019     o Medications have been Reconciled  o Transition of Care or Provider Policy  · Instructions  o Patient advised to monitor blood pressure (B/P) at home and journal readings. Patient informed that a B/P reading at home of more than 135/85 is considered hypertension. For readings greater [id number]/90 or higher patient is advised to follow up in the office with readings for management. Patient advised to limit sodium intake.  o Maintain a healthy weight. Avoid tight fitting clothes. Avoid fried, fatty foods, tomato sauce, chocolate, mint, garlic, onion, alcohol. caffeine. Eat smaller meals, dont lie down after a meal, dont smoke. Elevate the head of your bed 6-9 inches.  o Obtained a written consent for STEPHANIE query. Discussed the risk and benefits of the use of controlled substances with the patient, including the risk of tolerance and drug dependence. The patient has been counseled on the need to have an exit strategy, including potentially discontinuing the use of controlled substances. STEPHANIE has or will be reviewed as soon as it becomes avaliable.  o Take all medications as prescribed/directed.  o Patient was educated/instructed on their diagnosis, treatment and medications prior to discharge from the clinic today.  o Chronic conditions reviewed and taken into consideration for today's treatment plan.  o Electronically Identified Patient Education  Materials Provided Electronically  · Disposition  o Call or Return if symptoms worsen or persist.  o Care Transition  o FABIAN Sent  o Receipt Confirmed            Electronically Signed by: SAULO Mccann -Author on November 11, 2019 12:24:14 PM

## 2021-05-07 NOTE — PROGRESS NOTES
Progress Note      Patient Name: Marija Burgos   Patient ID: [id number]   Sex: Female   YOB: 1960    Primary Care Provider: Michelle VERNON   Referring Provider: Michelle VERNON    Visit Date: February 25, 2021    Provider: SAULO Mccann   Location: [facility]   Location Address: [address] [place] [address] [place]  [address]   Location Phone: [phone]          Chief Complaint     refills  has a question about the sucralfate       History Of Present Illness  Marija Burgos is a 61 year old /White female who presents for evaluation and treatment of:      follow up for refills    Chronic pain - has osteoarthritis - mainly hips are the worse than the other joints - Hx of Stage III CKD and has taken Tramadol for years - She is prescribed TID and for the most part takes TID on the days she works, but then only one or two on the days that she does not work. She feels like it may not be working as well as it used to - she is aching more than she isn't. It is worse when it rains. Just today she started taking Krill oil to see if that will help - a friend of hers takes it for her joints and that seems to help her. She has never taken Cymbalta. She has probably taken Tramadol for at least 20+ years.     HLD - prescribed Zetia in January - intolerant of statins - she has been doing well with the Zetia - no cramping    Hx of ELIO - stopped iron after completing RX    She states that Dr. Villarreal put her on Carafate - he gave her enough to last her a year - it was a huge bottle with refills - She hasn't taken it for at least a month and she has been doing fine - Dr. Villarreal saw her after her CCX d/t she had a leak from the duct of [place].       Past Medical History  Disease Name Date Onset Notes   GERD (gastroesophageal reflux disease) --  --    High risk medications (not anticoagulants) long-term use 05/12/2020 --    Hyperlipidemia --  --    Hypertension,  Benign Essential --  --    Osteoarthritis 05/12/2020 --    Stage III chronic kidney disease 05/12/2020 --    Statin not tolerated 05/12/2020 --          Past Surgical History  Procedure Name Date Notes   Cholecystecomy 2020 --          Medication List  Name Date Started Instructions   lisinopril 20 mg oral tablet 11/20/2020 take 1 tablet (20 mg) by oral route once daily   Nexium 20 mg oral capsule,delayed release(DR/EC)  take 1 capsule (20 mg) by oral route once daily at least 1 hour before a meal swallowing whole. Do not crush or chew granules.   tramadol 50 mg oral tablet 02/25/2021 take 2 takes Q am PO, then 1 tab Q evening for OA pain   Zetia 10 mg oral tablet 02/25/2021 take 1 tablet (10 mg) by oral route once daily         Allergy List  Allergen Name Date Reaction Notes   atorvastatin --  aches --    Neosporin --  --  --        Allergies Reconciled  Family Medical History  Disease Name Relative/Age Notes   Arthrtis Mother/   Mother         Social History  Finding Status Start/Stop Quantity Notes   Alcohol Former --/-- --  drank alcohol in the past, 7 or less drinks per week   Exercises regularly --  --/-- --  0 times per week   Recreational Drug Use Never --/-- --  never used   Second hand smoke exposure Unknown --/-- --  no   Tobacco Former --/-- 0.5 PPD former smoker, smokes less than 1 pack per day, for 10 years, never uses other tobacco products   Uses seatbelts --  --/-- --  no         Review of Systems  · Constitutional  o Admits  o : body aches  o Denies  o : fatigue, fever, chills  · HENT  o Denies  o : headaches, chronic sinus problem  · Cardiovascular  o Denies  o : chest pain, dyspnea on exertion, lower extremity edema, palpitations  · Respiratory  o Denies  o : cough  · Gastrointestinal  o Denies  o : nausea, vomiting, diarrhea, constipation, dysphagia, heartburn, reflux, abdominal pain  · Genitourinary  o Denies  o : dysuria, urinary retention  · Integument  o Denies  o : rash, pigmentation  "changes  · Neurologic  o Denies  o : tingling or numbness, dizziness  · Musculoskeletal  o Admits  o : joint pain, hip pain  · Psychiatric  o Denies  o : anxiety, depression, difficulty sleeping, suicidal ideation, homicidal ideation      Vitals  Date Time BP Position Site L\R Cuff Size HR RR TEMP (F) WT  HT  BMI kg/m2 BSA m2 O2 Sat FR L/min FiO2 HC       02/25/2021 01:51 /76 Sitting    86 - R   238lbs 0oz 5'  4\" 40.85 2.21 97 %            Physical Examination  · Constitutional  o Appearance  o : morbidly obese, well developed, alert, in no acute distress, well-tended appearance, no obvious deformities present  · Head and Face  o HEENT  o : Unremarkable - wearing a mask  · Respiratory  o Respiratory Effort  o : breathing unlabored  o Auscultation of Lungs  o : clear to auscultation  · Cardiovascular  o Heart  o :   § Auscultation of Heart  § : regular rate, normal rhythm, no murmurs present  o Peripheral Vascular System  o :   § Carotid Arteries  § : normal pulses bilaterally, no bruits present  § Extremities  § : no edema  · Lymphatic  o Neck  o : no lymphadenopathy present  · Musculoskeletal  o General  o :   § General Musculoskeletal  § : Muscle tone, strength, and development grossly normal.  · Skin and Subcutaneous Tissue  o General Inspection  o : no lesions present, no areas of discoloration, skin turgor normal, texture normal  · Neurologic  o Gait and Station  o :   § Gait Screening  § : normal gait  · Psychiatric  o Mood and Affect  o : mood normal, affect appropriate          Assessment  · Hyperlipidemia     272.4/E78.5  · Class 3 severe obesity with serious comorbidity and body mass index (BMI) of 40.0 to 44.9 in adult, unspecified obesity type       Morbid (severe) obesity due to excess calories     278.01/E66.01  Body mass index [BMI]40.0-44.9, adult     278.01/Z68.41  · Osteoarthritis     715.90/M19.90  · Need for hepatitis C screening test     V73.89/Z11.59  · High risk medication " use     V58.69/Z79.899  · Stage III chronic kidney disease     585.3/N18.3  · Statin not tolerated     995.27/Z78.9  · Hypertension, Benign Essential     401.1/I10  · History of iron deficiency     V12.3/Z86.39      Plan  · Orders  o CMP [facility] (50958) - 401.1/I10, 272.4/E78.5 - 02/25/2021  o Lipid Panel [facility] (90108) - 272.4/E78.5 - 02/25/2021  o ACO-18: Negative screen for clinical depression using a standardized tool () - - 02/25/2021  o ACO-19: Colorectal cancer screening results documented and reviewed (3017F) - - 02/25/2021   Negative Cologuard 2019 - repeat 2022  o ACO-39: Current medications updated and reviewed (1159F, ) - - 02/25/2021  o Iron Profile (Iron 47389 TIBC 78842 and Transferrin 14735) (IRONP) - V12.3/Z86.39 - 02/25/2021  o Ferritin ser/plas (70722) - V12.3/Z86.39 - 02/25/2021  o Hepatitis C antibody (10190) - V73.89/Z11.59 - 02/25/2021  · Medications  o tramadol 50 mg oral tablet   SIG: take 2 takes Q am PO, then 1 tab Q evening for OA pain   DISP: (90) Tablet with 2 refills  Refilled on 02/25/2021     o Zetia 10 mg oral tablet   SIG: take 1 tablet (10 mg) by oral route once daily   DISP: (90) Tablet with 0 refills  Refilled on 02/25/2021     o sucralfate 1 gram oral tablet   SIG: take 1 tablet (1 gram) by oral route 4 times per day on an empty stomach 1 hour before meals and at bedtime   DISP: (0) Tablet with 0 refills  Discontinued on 02/25/2021     o Medications have been Reconciled  o Transition of Care or Provider Policy  · Instructions  o Tylenol may be used as needed every 4-6 hours for pain.  o Obtained a written consent for STEPHANIE query. Discussed the risk and benefits of the use of controlled substances with the patient, including the risk of tolerance and drug dependence. The patient has been counseled on the need to have an exit strategy, including potentially discontinuing the use of controlled substances. STEPHANIE has or will be reviewed as soon as it becomes avaliable.  o Take  all medications as prescribed/directed.  o Patient was educated/instructed on their diagnosis, treatment and medications prior to discharge from the clinic today. Okay to stop Carafate. Continue Tramadol at current dose - she wants to try Krill Oil to see if that will help with aches - call in one month and if no improvement then we have discussed use of Cymbalta. Refill Zetia. she is going to [facility] next Monday for fasting labs. Repeat iron studies.   o Chronic conditions reviewed and taken into consideration for today's treatment plan.            Electronically Signed by: SAULO Mccann -Author on May 24, 2021 02:19:45 PM

## 2021-05-07 NOTE — PROGRESS NOTES
Progress Note      Patient Name: Marija Burgos   Patient ID: [id number]   Sex: Female   YOB: 1960        Visit Date: August 9, 2018    Provider: YONATHAN Hernandez   Location: [facility]   Location Address: [address] [place] [address]  [id number]   Location Phone: [phone]          Chief Complaint     PATIENT IS HERE FOR MEDICATION REFILLS.       History Of Present Illness  Marija Burgos is a 58 year old /White female who presents for evaluation and treatment of:      pt used to see Tamra Carr--and then now been seeing DR Loera--    and pt needs refills on everything--and labs ordered--    and pt states was always on the 2 BP pills daily and somehow it got changed to 1 tab daily--and the pharmacist--gave her some to use until here--been having edema       Past Medical History  Disease Name Date Onset Notes   Arthritis --  --    GERD (gastroesophageal reflux disease) --  --    Hyperlipidemia --  --    Hypertension, Benign Essential --  --          Past Surgical History  Procedure Name Date Notes   *Denies any surgical procedures --  --          Medication List  Name Date Started Instructions   atorvastatin 20 mg oral tablet 02/06/2018 take 1 tablet (20 mg) by oral route once daily for 90 days   lisinopril-hydrochlorothiazide 20-12.5 mg oral tablet 05/29/2018 TAKE ONE TABLET BY MOUTH ONCE DAILY   Neurontin 100 mg oral capsule 04/24/2018 take 1 capsule by oral route 3 times a day as needed for 7 days pain   omeprazole 20 mg oral capsule,delayed release(DR/EC)  take 1 capsule (20 mg) by oral route once daily before a meal   tramadol 50 mg oral tablet 02/06/2018 take 2 tablets by oral route daily for 30 days in AM and 1 in PM         Allergy List  Allergen Name Date Reaction Notes   Neosporin --  --  --          Family Medical History  Disease Name Relative/Age Notes   Arthrtis / Mother    Mother/          Social History  Finding Status  "Start/Stop Quantity Notes   Alcohol Former --/-- --  drank alcohol in the past, 7 or less drinks per week   Exercises regularly --  --/-- --  0 times per week   Recreational Drug Use Never --/-- --  never used   Second hand smoke exposure Unknown --/-- --  no   Tobacco Former --/-- 0.5 PPD former smoker, smokes less than 1 pack per day, for 10 years, never uses other tobacco products   Uses seatbelts --  --/-- --  no         Review of Systems  · Constitutional  o Admits  o : fatigue  o Denies  o : fever  · HENT  o Denies  o : vertigo, recent head injury  · Cardiovascular  o Admits  o : lower extremity edema  o Denies  o : chest pain, irregular heart beats  · Respiratory  o Denies  o : shortness of breath, productive cough  · Gastrointestinal  o Denies  o : nausea, vomiting  · Genitourinary  o Denies  o : dysuria, urinary retention  · Integument  o Denies  o : hair growth change, new skin lesions  · Neurologic  o Denies  o : altered mental status, seizures  · Musculoskeletal  o Admits  o : joint pain, hip pain  o Denies  o : joint swelling, limitation of motion  · Endocrine  o Denies  o : cold intolerance, heat intolerance  · Psychiatric  o Denies  o : anxiety, depression, suicidal ideation, homicidal ideation  · Heme-Lymph  o Denies  o : petechiae, lymph node enlargement or tenderness  · Allergic-Immunologic  o Denies  o : frequent illnesses      Vitals  Date Time BP Position Site L\R Cuff Size HR RR TEMP(F) WT  HT  BMI kg/m2 BSA m2 O2 Sat        08/09/2018 05:21 /74 Sitting    80 - R  96.6 275lbs 2oz 5'  7\" 43.09 2.43 98 %           Physical Examination  · Constitutional  o Appearance  o : well developed, well-nourished, in no acute distress  · Head and Face  o HEENT  o : Unremarkable  · Eyes  o Conjunctivae  o : conjunctivae normal  · Neck  o Inspection/Palpation  o : supple  o Thyroid  o : no thyromegaly  · Respiratory  o Respiratory Effort  o : breathing unlabored  o Auscultation of Lungs  o : clear to " ascultation  · Cardiovascular  o Heart  o :   § Auscultation of Heart  § : regular rate and rhythm  o Peripheral Vascular System  o :   § Extremities  § : 1-2+ BLE edema  · Gastrointestinal  o Abdominal Examination  o :   § Abdomen  § : soft  · Lymphatic  o Neck  o : no lymphadenopathy present  · Musculoskeletal  o General  o :   § General Musculoskeletal  § : No joint swelling or deformity., Muscle tone, strength, and development grossly normal.--except the chronic hip pain OA bilat   · Skin and Subcutaneous Tissue  o General Inspection  o : , skin turgor normal, texture normal  · Neurologic  o Gait and Station  o :   § Gait Screening  § : normal gait  · Psychiatric  o Mood and Affect  o : mood normal, affect appropriate              Assessment  · Visit for screening mammogram     V76.12/Z12.31  · Essential hypertension     401.9/I10  · Hyperlipidemia     272.4/E78.5  · Osteoarthritis     715.90/M19.90  · Chronic hip pain       Pain in unspecified hip     719.45/M25.559  Other chronic pain     719.45/G89.29  · High risk medication use     V58.69/Z79.899  · BMI 40.0-44.9, adult     V85.41/Z68.41      Plan  · Orders  o Screening Mammogram 2D Bilateral (48314, ) - V76.12/Z12.31 - 08/09/2018   prefers local please  o CBC with Auto Diff [facility] (35297) - 401.9/I10 - 08/09/2018  o CMP [facility] (48405) - 272.4/E78.5 - 08/09/2018  o Lipid Panel [facility] (86606) - 272.4/E78.5 - 08/09/2018  o TSH [facility] (25147) - 401.9/I10, 272.4/E78.5 - 08/09/2018  o Urine Drug Screen ([facility]) (56934) - 715.90/M19.90, 719.45/M25.559, 719.45/G89.29, V58.69/Z79.899 - 08/09/2018  o ACO-14: Influenza immunization administered or previously received () - - 08/09/2018  o ACO-16: BMI above normal range and follow-up plan is documented () - - 08/09/2018  o ACO-18: Negative screen for clinical depression using a standardized tool () - - 08/09/2018  o ACO-39: Current medications updated and reviewed () - -  08/09/2018  · Medications  o atorvastatin 20 mg oral tablet   SIG: take 1 tablet (20 mg) by oral route once daily   DISP: (90) tablet with 1 refills  Adjusted on 08/09/2018     o lisinopril-hydrochlorothiazide 20-12.5 mg oral tablet   SIG: take 2 tablets by oral route daily   DISP: (180) Tablet with 1 refills  Adjusted on 08/09/2018     o tramadol 50 mg oral tablet   SIG: take 2 takes Q am PO, then 1 tab Q evening for OA pain   DISP: (90) tablet with 2 refills  Adjusted on 08/09/2018     o Neurontin 100 mg oral capsule   SIG: take 1 capsule by oral route 3 times a day as needed for 7 days pain   DISP: (21) capsules with 0 refills  Discontinued on 08/09/2018     · Instructions  o Patient advised to monitor blood pressure (B/P) at home and journal readings. Patient informed that a B/P reading at home of more than 135/85 is considered hypertension. For readings greater qvra136/90 or higher patient is advised to follow up in the office with readings for management. Patient advised to limit sodium intake.  o Recommended exercise program to assist with cholesterol, weight loss and overall health improvement.  o Advised that cheeses and other sources of dairy fats, animal fats, fast food, and the extras (candy, pasteries, pies, doughnuts and cookies) all contain LDL raising nutrients. Advised to increase fruits, vegetables, whole grains, and to monitor portion sizes.   o Tylenol may be used as needed every 4-6 hours for pain.  o Obtained a written consent for STEPHANIE query. Discussed the risk and benefits of the use of controlled substances with the patient, including the risk of tolerance and drug dependence. The patient has been counseled on the need to have an exit strategy, including potentially discontinuing the use of controlled substances. STEPHANIE has or will be reviewed as soon as it becomes avaliable.  o Take all medications as prescribed/directed.  o Patient was educated/instructed on their diagnosis, treatment and  medications prior to discharge from the clinic today.  o Patient counseled to reduce calorie intake.  o Patient was instructed to exercise regularly.  o Patient instructed to seek medical attention urgently for new or worsening symptoms.  o Call the office with any concerns or questions.  o Risks, benefits, and alternatives were discussed with the patient. The patient is aware of risks associated with: with CS use   o Chronic conditions reviewed and taken into consideration for today's treatment plan.  · Disposition  o Call or Return if symptoms worsen or persist.  o Return Visit Request in/on 3 months +/- 2 days (1543).            Electronically Signed by: YONATHAN Hernandez -Author on August 9, 2018 05:49:27 PM

## 2021-05-07 NOTE — PROGRESS NOTES
Progress Note      Patient Name: Marija Burgos   Patient ID: [id number]   Sex: Female   YOB: 1960        Visit Date: November 6, 2018    Provider: Kathrin Loera MD   Location: [facility]   Location Address: [address] [place] [address]  [id number]   Location Phone: [phone]          Chief Complaint     Refill all meds       History Of Present Illness  Marija Burgos is a 58 year old /White female who presents for evaluation and treatment of:      She takes Tramadol for her hip osteoarthritis.  She has trouble going up steps.  She is able to do most of what she needs to do with the Tramadol.  She hurts a lot in the AM.  She works at [employer] and does a lot of lifting and tugging.  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   She had her labs done yesterday.  The creatinine has gone up so I am going to remove the water pill.  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  She works 40 hrs/ week.  She is off Mon-Tues.  She already had a flu vaccine at work.  he had the shingles and wants the shot.  She will get it next year.       Past Medical History  Disease Name Date Onset Notes   Arthritis --  --    GERD (gastroesophageal reflux disease) --  --    Hyperlipidemia --  --    Hypertension, Benign Essential --  --          Past Surgical History  Procedure Name Date Notes   *Denies any surgical procedures --  --          Medication List  Name Date Started Instructions   atorvastatin 20 mg oral tablet 08/14/2018 TAKE ONE TABLET BY MOUTH ONCE DAILY   lisinopril-hydrochlorothiazide 20-12.5 mg oral tablet 08/09/2018 take 2 tablets by oral route daily   omeprazole 20 mg oral capsule,delayed release(DR/EC)  take 1 capsule (20 mg) by oral route once daily before a meal   tramadol 50 mg oral tablet 08/09/2018 take 2 takes Q am PO, then 1 tab Q evening for OA pain         Allergy List  Allergen Name Date  Reaction Notes   Neosporin --  --  --          Family Medical History  Disease Name Relative/Age Notes   Arthrtis / Mother    Mother/          Social History  Finding Status Start/Stop Quantity Notes   Alcohol Former --/-- --  drank alcohol in the past, 7 or less drinks per week   Exercises regularly --  --/-- --  0 times per week   Recreational Drug Use Never --/-- --  never used   Second hand smoke exposure Unknown --/-- --  no   Tobacco Former --/-- 0.5 PPD former smoker, smokes less than 1 pack per day, for 10 years, never uses other tobacco products   Uses seatbelts --  --/-- --  no         Vitals  Date Time BP Position Site L\R Cuff Size HR RR TEMP(F) WT  HT  BMI kg/m2 BSA m2 O2 Sat HC       11/06/2018 04:18 /86 Sitting    102 - R  98.1 276lbs 0oz    98 %           Physical Examination  · Constitutional  o Appearance  o : well developed, well-nourished, in no acute distress  · Eyes  o Conjunctivae  o : conjunctivae normal  · Neck  o Inspection/Palpation  o : supple  o Thyroid  o : no thyromegaly  · Respiratory  o Respiratory Effort  o : breathing unlabored  o Auscultation of Lungs  o : clear to ascultation  · Cardiovascular  o Heart  o :   § Auscultation of Heart  § : regular rate and rhythm  o Peripheral Vascular System  o :   § Extremities  § : no edema  · Lymphatic  o Neck  o : no lymphadenopathy present  · Musculoskeletal  o General  o :   § General Musculoskeletal  § : No joint swelling or deformity., Muscle tone, strength, and development grossly normal.  · Skin and Subcutaneous Tissue  o General Inspection  o : no lesions present, no areas of discoloration, skin turgor normal, texture normal  · Neurologic  o Gait and Station  o :   § Gait Screening  § : normal gait  · Psychiatric  o Mood and Affect  o : mood normal, affect appropriate          Assessment  · Essential hypertension     401.9/I10  · Hyperlipidemia     272.4/E78.5  · Osteoarthritis     715.90/M19.90      Plan  · Orders  o ACO-39:  Current medications updated and reviewed () - - 11/06/2018  o PHYSICAL THERAPY CONSULTATION (formerly [facility]) - 715.90/M19.90 - 11/06/2018   She has bilateral hip pain and is overweight. I would like her to go to [facility] and to the [facility] program to address the weight also.   her day off is Mon-Tues  · Medications  o lisinopril 40 mg oral tablet   SIG: take 1 tablet (40 mg) by oral route once daily for 90 days   DISP: (90) tablets with 1 refills  Prescribed on 11/06/2018     o atorvastatin 20 mg oral tablet   SIG: TAKE ONE TABLET BY MOUTH ONCE DAILY   DISP: (90) Tablet with 1 refills  Adjusted on 11/06/2018     o tramadol 50 mg oral tablet   SIG: take 2 takes Q am PO, then 1 tab Q evening for OA pain   DISP: (90) tablet with 2 refills  Adjusted on 11/06/2018     o lisinopril-hydrochlorothiazide 20-12.5 mg oral tablet   SIG: take 2 tablets by oral route daily   DISP: (180) Tablet with 1 refills  Discontinued on 11/06/2018     · Instructions  o Advised that cheeses and other sources of dairy fats, animal fats, fast food, and the extras (candy, pasteries, pies, doughnuts and cookies) all contain LDL raising nutrients. Advised to increase fruits, vegetables, whole grains, and to monitor portion sizes.   o Patient was educated/instructed on their diagnosis, treatment and medications prior to discharge from the clinic today.            Electronically Signed by: Kathrin Loera MD -Author on November 6, 2018 04:55:02 PM

## 2021-05-09 VITALS
TEMPERATURE: 97.5 F | SYSTOLIC BLOOD PRESSURE: 120 MMHG | DIASTOLIC BLOOD PRESSURE: 80 MMHG | HEART RATE: 94 BPM | WEIGHT: 248 LBS | OXYGEN SATURATION: 96 %

## 2021-05-09 VITALS
HEART RATE: 120 BPM | TEMPERATURE: 97.6 F | SYSTOLIC BLOOD PRESSURE: 128 MMHG | WEIGHT: 276.37 LBS | OXYGEN SATURATION: 98 % | DIASTOLIC BLOOD PRESSURE: 80 MMHG

## 2021-05-09 VITALS
HEIGHT: 67 IN | BODY MASS INDEX: 43.18 KG/M2 | DIASTOLIC BLOOD PRESSURE: 74 MMHG | SYSTOLIC BLOOD PRESSURE: 122 MMHG | OXYGEN SATURATION: 98 % | WEIGHT: 275.12 LBS | TEMPERATURE: 96.6 F | HEART RATE: 80 BPM

## 2021-05-09 VITALS
TEMPERATURE: 97.6 F | SYSTOLIC BLOOD PRESSURE: 160 MMHG | HEART RATE: 114 BPM | OXYGEN SATURATION: 97 % | BODY MASS INDEX: 43.93 KG/M2 | WEIGHT: 273.37 LBS | HEIGHT: 66 IN | DIASTOLIC BLOOD PRESSURE: 92 MMHG

## 2021-05-09 VITALS
SYSTOLIC BLOOD PRESSURE: 120 MMHG | HEART RATE: 123 BPM | BODY MASS INDEX: 44.02 KG/M2 | OXYGEN SATURATION: 97 % | HEIGHT: 67 IN | TEMPERATURE: 97.2 F | WEIGHT: 280.5 LBS | DIASTOLIC BLOOD PRESSURE: 72 MMHG

## 2021-05-09 VITALS
HEIGHT: 64 IN | BODY MASS INDEX: 40.63 KG/M2 | HEART RATE: 86 BPM | SYSTOLIC BLOOD PRESSURE: 120 MMHG | DIASTOLIC BLOOD PRESSURE: 76 MMHG | WEIGHT: 238 LBS | OXYGEN SATURATION: 97 %

## 2021-05-09 VITALS
HEART RATE: 107 BPM | DIASTOLIC BLOOD PRESSURE: 84 MMHG | WEIGHT: 260.25 LBS | TEMPERATURE: 97.8 F | SYSTOLIC BLOOD PRESSURE: 120 MMHG | OXYGEN SATURATION: 98 %

## 2021-05-09 VITALS
OXYGEN SATURATION: 94 % | DIASTOLIC BLOOD PRESSURE: 90 MMHG | WEIGHT: 262 LBS | SYSTOLIC BLOOD PRESSURE: 140 MMHG | HEART RATE: 139 BPM | TEMPERATURE: 97.7 F

## 2021-05-09 VITALS
HEART RATE: 102 BPM | WEIGHT: 276 LBS | SYSTOLIC BLOOD PRESSURE: 132 MMHG | DIASTOLIC BLOOD PRESSURE: 86 MMHG | TEMPERATURE: 98.1 F | OXYGEN SATURATION: 98 %

## 2021-05-09 VITALS
OXYGEN SATURATION: 98 % | TEMPERATURE: 97.2 F | HEART RATE: 116 BPM | WEIGHT: 278 LBS | SYSTOLIC BLOOD PRESSURE: 150 MMHG | DIASTOLIC BLOOD PRESSURE: 82 MMHG

## 2021-05-09 VITALS
OXYGEN SATURATION: 96 % | HEART RATE: 105 BPM | SYSTOLIC BLOOD PRESSURE: 120 MMHG | WEIGHT: 242 LBS | TEMPERATURE: 98.4 F | DIASTOLIC BLOOD PRESSURE: 80 MMHG

## 2021-05-13 NOTE — PROGRESS NOTES
"   Progress Note      Patient Name: Marija Burgos   Patient ID: [id number]   Sex: Female   YOB: 1960    Primary Care Provider: Michelle VERNON   Referring Provider: Michelle VERNON    Visit Date: November 18, 2020    Provider: Angel Esquivel MD   Location: [facility]   Location Address: [address] [place] [address]  [id number]   Location Phone: [phone]          Chief Complaint  · Follow Up Surgery      History Of Present Illness  Marija Burgos is a 60 year old /White female who presents today for a postoperative visit. She follows-up after laparoscopic cholecystectomy. She was found, at the last visit, to have bile leak, which appears to be a duct of Luschka leak in the liver bed based on a HIDA scan that was obtained. I tried to get gastroenterology to place a stent in her common bile duct in order to help close the duct of Luschka leak. Unfortunately GI was unable to access the common bile duct secondary to a history of pyloric stenosis. They were unable to get the ERCP scope past her pylorus. We elected to see if the leak would close on its own since it was controlled with the drain. Since she has been discharged, she reports it is still draining some \"greenish stuff\" that she thinks looks bad but the amount of drainage has decreased quite a bit. She is not reporting any fevers or chills. No nausea or vomiting.       Past Medical History  Arthritis; Essential hypertension; GERD (gastroesophageal reflux disease); High cholesterol; Kidney disorder         Past Surgical History  *No Past Surgical History         Medication List  ferrous sulfate 325 mg (65 mg iron) oral tablet; lisinopril 40 mg oral tablet; Nexium 20 mg oral capsule,delayed release(DR/EC); sucralfate 1 gram oral tablet; tramadol 50 mg oral tablet         Allergy List  Neosporin       Allergies Reconciled  Family Medical History  Family history of heart disease         Social " History  Alcohol (Current some day); ; Tobacco (Former); Working         Review of Systems  · Constitutional  o Denies  o : chills, excessive sweating, fatigue, fever, sycope/passing out, weight gain, weight loss  · Eyes  o Denies  o : changes in vision, blurry vision, double vision  · HENT  o Denies  o : loss of hearing, ringing in the ears, ear aches, sore throat, nasal congestion, sinus pain, nose bleeds, seasonal allergies  · Cardiovascular  o Denies  o : blood clots, swollen legs, anemia, easy burising or bleeding, transfusions  · Respiratory  o Denies  o : shortness of breath, dry cough, productive cough, pneumonia, COPD  · Gastrointestinal  o Denies  o : difficulty swallowing, reflux  · Genitourinary  o Denies  o : incontinence  · Neurologic  o Denies  o : headache, seizure, stroke, tremor, loss of balance, falls, dizziness/vertigo, difficulty with sleep, numbness/tingling/paresthesia , difficulty with coordination, difficulty with dexterity, weakness  · Musculoskeletal  o Denies  o : neck stiffness/pain, swollen lymph nodes, muscle aches, joint pain, weakness, spasms, sciatica, pain radiating in arm, pain radiating in leg, low back pain  · Endocrine  o Denies  o : diabetes, thyroid disorder  · Psychiatric  o Denies  o : anxiety, depression      Physical Examination  · Constitutional  o Appearance  o : well developed, well-nourished, alert and in no acute distress  · Head and Face  o Head  o :   § Inspection  § : no deformities or lesions  · Eyes  o Conjunctivae  o : clear  o Sclerae  o : nonicteric  · Neck  o Inspection/Palpation  o : normal appearance, no masses or tenderness, trachea midline  · Respiratory  o Respiratory Effort  o : breathing unlabored  o Inspection of Chest  o : normal appearance, no retractions  · Cardiovascular  o Heart  o : regular rate and rhythm  · Gastrointestinal  o Abdominal Examination  o :   § Abdomen  § : abdomen is soft. Her drain site has a little bit of erythema  around the insertion site but no obvious signs of infection. Her drain is completely empty at this time, with nothing inside.   · Lymphatic  o Neck  o : no lymphadenopathy present  o Axilla  o : no lymphadenopathy present  o Groin  o : no lymphadenopathy present  · Skin and Subcutaneous Tissue  o General Inspection  o : no rashes present, no lesions present, no areas of discoloration  · Neurologic  o Cranial Nerves  o : grossly intact  o Sensation  o : grossly intact  o Gait and Station  o :   § Gait Screening  § : normal gait, able to stand without diffculty  o Cerebellar Function  o : no obvious abnormalities  · Psychiatric  o Judgement and Insight  o : judgment and insight intact  o Mood and Affect  o : mood normal, affect appropriate          Assessment  · Encounter for examination following surgery     V67.00/Z09       Patient with a postoperative bile leak from the liver bed, which seems to be closing.       Plan     I am actually pleased with the amount that her drainage has decreased. She showed me multiple pictures from throughout the couple of weeks and it does appear to be much more serous. It does not appear to be karlee bile. I can't evaluate the drainage today because there is nothing in her drain, which I actually suppose is a good thing, seeing as it is continuing to decrease.     At this point in time, I think we can continue to monitor the drainage. We may be able to take it off of suction at some point. I think this leak is showing evidence that it is going to seal on its own. The patient reports she has a repeat visit and possible repeat dilation of her pylorus with Dr. Villarreal in the near future. He has sent her a note saying that he wants to try and repeat her dilation. I will try and discuss this potential procedure with Dr. Villarreal. If he is able to do that, I guess he could try and do an ERCP and place a stent at that same time. If he elects not to do the procedure, I think we can continue with  our current management with the drain and I think in the next week or so, that drainage would will be completely sealed off. I discussed all of this with the patient. All questions were answered.  She voiced understanding and agreed to proceed with the above plan.     ADDENDUM:I did speak with Dr. Villarreal this evening. He agrees with the plan of trying to allow the leak to seal on its own and he wants to hold off on any further procedures. I will call and discuss this with the patient.             Electronically Signed by: Eryn Randolph-, -Author on November 19, 2020 09:19:17 AM  Electronically Co-signed by: Angel Esquivel MD -Reviewer on November 19, 2020 10:14:28 AM

## 2021-05-13 NOTE — PROGRESS NOTES
"   Progress Note      Patient Name: Marija Burgos   Patient ID: [id number]   Sex: Female   YOB: 1960    Primary Care Provider: Michelle VERNON   Referring Provider: Michelle VERNON    Visit Date: November 25, 2020    Provider: Angel Esquivel MD   Location: [facility]   Location Address: [address] [place] [address]  [id number]   Location Phone: [phone]          Chief Complaint  · Follow Up Surgery      History Of Present Illness  Marija Burgos is a 60 year old /White female who presents today for a postoperative visit. She follows-up status post difficult cholecystectomy. She was found to have a bile leak from a duct of Luschka. She has had a drain in place, which is controlling the duct of Luschka leak. She reports she has had continued decreased output from the drain. She does continue to have somewhere between 5-20 cc out per day but the drainage that is coming out now is almost completely serous. It does not appear bilious to me.       Past Medical History  Arthritis; Essential hypertension; GERD (gastroesophageal reflux disease); High cholesterol; Kidney disorder         Past Surgical History  Cholecystectomy         Medication List  ferrous sulfate 325 mg (65 mg iron) oral tablet; lisinopril 40 mg oral tablet; Nexium 20 mg oral capsule,delayed release(DR/EC); sucralfate 1 gram oral tablet; tramadol 50 mg oral tablet         Allergy List  Neosporin         Family Medical History  Family history of heart disease         Social History  Alcohol (Current some day); ; Tobacco (Former); Working         Review of Systems  · Gastrointestinal  o Denies  o : nausea, vomiting, diarrhea, constipation, flank pain      Vitals  Date Time BP Position Site L\R Cuff Size HR RR TEMP (F) WT  HT  BMI kg/m2 BSA m2 O2 Sat FR L/min FiO2 HC       11/25/2020 09:25 AM       14  264lbs 0oz 5'  7\" 41.35 2.38             Physical " Examination  · Constitutional  o Appearance  o : well developed, well-nourished, alert and in no acute distress  · Head and Face  o Head  o :   § Inspection  § : no deformities or lesions  · Eyes  o Conjunctivae  o : clear  o Sclerae  o : nonicteric  · Neck  o Inspection/Palpation  o : normal appearance, no masses or tenderness, trachea midline  · Respiratory  o Respiratory Effort  o : breathing unlabored  o Inspection of Chest  o : normal appearance, no retractions  · Cardiovascular  o Heart  o : regular rate and rhythm  · Gastrointestinal  o Abdominal Examination  o :   § Abdomen  § : abdomen is soft . RODO is in place. Her bulb was empty prior to arrival so it was completely empty. She showed me pictures of serous appearing drainage.   · Lymphatic  o Neck  o : no lymphadenopathy present  o Axilla  o : no lymphadenopathy present  o Groin  o : no lymphadenopathy present  · Skin and Subcutaneous Tissue  o General Inspection  o : no rashes present, no lesions present, no areas of discoloration  · Neurologic  o Cranial Nerves  o : grossly intact  o Sensation  o : grossly intact  o Gait and Station  o :   § Gait Screening  § : normal gait, able to stand without diffculty  o Cerebellar Function  o : no obvious abnormalities  · Psychiatric  o Judgement and Insight  o : judgment and insight intact  o Mood and Affect  o : mood normal, affect appropriate          Assessment  · Encounter for examination following surgery     V67.00/Z09  · Status post laparoscopic cholecystectomy     V45.89/Z90.49       Patient status post laparoscopic cholecystectomy with a liver bed leak.       Plan  · Medications  o Medications have been Reconciled  o Transition of Care or Provider Policy  · Instructions  o Electronically Identified Patient Education Materials Provided Electronically     The patient seems to be healing appropriately. I think the leak is closed. I offered her two options. One is we could check the drainage for bilirubin and  make sure she doesn't have any obvious bilirubin in the drain or we can just go ahead and remove the drain and see how she does. She is worried about removing the drain too soon so we will plan for measuring the bilirubin in the RODO drain fluid. If that comes back negative, we will plan for drain removal. Otherwise, we can keep the drain for a little while longer to make sure it seals. I discussed all of this with the patient. All questions were answered.  She voiced understanding and agreed to proceed with the above plan.             Electronically Signed by: Eryn Randolph-, -Author on November 30, 2020 01:11:01 PM  Electronically Co-signed by: Angel Esquivel MD -Reviewer on December 1, 2020 11:59:57 AM

## 2021-05-13 NOTE — PROGRESS NOTES
Progress Note      Patient Name: Marija Burgos   Patient ID: [id number]   Sex: Female   YOB: 1960    Primary Care Provider: Michelle VERNON   Referring Provider: Michelle VERNON    Visit Date: December 2, 2020    Provider: Angel Esquivel MD   Location: [facility]   Location Address: [address] [place] [address]  [id number]   Location Phone: [phone]          Chief Complaint  · Follow Up Office Visit      History Of Present Illness  Marija Burgos is a 60 year old /White female who presents today for a postoperative visit. She follows-up for drain evaluation from a very difficult cholecystectomy. The patient has done well the past week. She is not reporting any nausea or vomiting. She is eating and drinking normally. No new symptoms. I did measure the bilirubin in her drain and the bilirubin was equivocal to her serum bilirubin so I don't think she is having any leak from her liver anymore. She is having minimal output, which appears to be mostly serous.       Past Medical History  Arthritis; Essential hypertension; GERD (gastroesophageal reflux disease); High cholesterol; Kidney disorder         Past Surgical History  Cholecystectomy         Medication List  ferrous sulfate 325 mg (65 mg iron) oral tablet; lisinopril 40 mg oral tablet; Nexium 20 mg oral capsule,delayed release(DR/EC); sucralfate 1 gram oral tablet; tramadol 50 mg oral tablet         Allergy List  Neosporin         Family Medical History  Family history of heart disease         Social History  Alcohol (Current some day); ; Tobacco (Former); Working         Review of Systems  · Cardiovascular  o Denies  o : chest pain on exertion, shortness of breath, lower extremity swelling  · Respiratory  o Denies  o : shortness of breath, coughing up blood  · Gastrointestinal  o Denies  o : chronic abdominal pain      Vitals  Date Time BP Position Site L\R Cuff Size HR RR TEMP (F) WT  " HT  BMI kg/m2 BSA m2 O2 Sat FR L/min FiO2 HC       12/02/2020 09:50 AM         248lbs 6oz 5'  7\" 38.9 2.31             Physical Examination  · Constitutional  o Appearance  o : well developed, well-nourished, alert and in no acute distress  · Head and Face  o Head  o :   § Inspection  § : no deformities or lesions  · Eyes  o Conjunctivae  o : clear  o Sclerae  o : nonicteric  · Neck  o Inspection/Palpation  o : normal appearance, no masses or tenderness, trachea midline  · Respiratory  o Respiratory Effort  o : breathing unlabored  o Inspection of Chest  o : normal appearance, no retractions  · Cardiovascular  o Heart  o : regular rate and rhythm  · Gastrointestinal  o Abdominal Examination  o :   § Abdomen  § : abdomen is soft. Her stitch from her drain has pulled out but it is taped into place. She is having serous drainage into the RODO  · Lymphatic  o Neck  o : no lymphadenopathy present  o Axilla  o : no lymphadenopathy present  o Groin  o : no lymphadenopathy present  · Skin and Subcutaneous Tissue  o General Inspection  o : no rashes present, no lesions present, no areas of discoloration  · Neurologic  o Cranial Nerves  o : grossly intact  o Sensation  o : grossly intact  o Gait and Station  o :   § Gait Screening  § : normal gait, able to stand without diffculty  o Cerebellar Function  o : no obvious abnormalities  · Psychiatric  o Judgement and Insight  o : judgment and insight intact  o Mood and Affect  o : mood normal, affect appropriate          Assessment  · Encounter for examination following surgery     V67.00/Z09       Patient with bile leak status post laparoscopic cholecystectomy.       Plan  · Medications  o Medications have been Reconciled  o Transition of Care or Provider Policy  · Instructions  o Electronically Identified Patient Education Materials Provided Electronically     The bile leak seems to have sealed based on the appearance of her drainage and the lack of increased bilirubin in her " lab results. I did remove the drain today and placed a dressing. At this point in time, I think she is doing quite well. Seeing as the bile leak is sealed, I don't think she requires any further follow-up. She can call with any questions or concerns or any new or concerning symptoms. I discussed all of this with the patient. All questions were answered.  She voiced understanding and agreed to proceed with the above plan.             Electronically Signed by: Eryn Randolph-, -Author on December 2, 2020 03:41:09 PM  Electronically Co-signed by: Angel Esquivel MD -Reviewer on December 3, 2020 10:17:19 PM

## 2021-05-13 NOTE — PROGRESS NOTES
"   Progress Note      Patient Name: Marija Burgos   Patient ID: [id number]   Sex: Female   YOB: 1960    Primary Care Provider: Michelle VERNON   Referring Provider: Michelle VERNON    Visit Date: November 4, 2020    Provider: Angel Esquivel MD   Location: [facility]   Location Address: [address] [place] [address]  [id number]   Location Phone: [phone]          Chief Complaint  · Follow Up Surgery      History Of Present Illness  Marija Burgos is a 60 year old /White female who presents today for a postoperative visit. She follows-up status post laparoscopic cholecystectomy. The patient has done well after her procedure. She reports she is sore, especially in the right upper quadrant but feels much better than she did prior to admission. She reports no nausea or vomiting. No fevers or chills. She reports she has had a lot of drainage out of her RODO drain.       Past Medical History  Arthritis; Essential hypertension; GERD (gastroesophageal reflux disease); High cholesterol; Kidney disorder         Past Surgical History  *No Past Surgical History         Medication List  ferrous sulfate 325 mg (65 mg iron) oral tablet; lisinopril 40 mg oral tablet; Nexium 20 mg oral capsule,delayed release(DR/EC); sucralfate 1 gram oral tablet; tramadol 50 mg oral tablet         Allergy List  Neosporin         Family Medical History  Family history of heart disease         Social History  Alcohol (Current some day); ; Tobacco (Former); Working         Review of Systems  · Gastrointestinal  o Denies  o : nausea, vomiting, diarrhea, constipation, flank pain      Vitals  Date Time BP Position Site L\R Cuff Size HR RR TEMP (F) WT  HT  BMI kg/m2 BSA m2 O2 Sat FR L/min FiO2 HC       11/04/2020 10:41 AM         264lbs 7oz 5'  7\" 41.42 2.38             Physical Examination  · Constitutional  o Appearance  o : well developed, well-nourished, alert and in no acute " distress  · Head and Face  o Head  o :   § Inspection  § : no deformities or lesions  · Eyes  o Conjunctivae  o : clear  o Sclerae  o : nonicteric  · Neck  o Inspection/Palpation  o : normal appearance, no masses or tenderness, trachea midline  · Respiratory  o Respiratory Effort  o : breathing unlabored  o Inspection of Chest  o : normal appearance, no retractions  · Cardiovascular  o Heart  o : regular rate and rhythm  · Gastrointestinal  o Abdominal Examination  o :   § Abdomen  § : abdomen is soft. Incisions appear to be healing well. No signs of infection but she does have bile in her drain.   · Lymphatic  o Neck  o : no lymphadenopathy present  o Axilla  o : no lymphadenopathy present  o Groin  o : no lymphadenopathy present  · Skin and Subcutaneous Tissue  o General Inspection  o : no rashes present, no lesions present, no areas of discoloration  · Neurologic  o Cranial Nerves  o : grossly intact  o Sensation  o : grossly intact  o Gait and Station  o :   § Gait Screening  § : normal gait, able to stand without diffculty  o Cerebellar Function  o : no obvious abnormalities  · Psychiatric  o Judgement and Insight  o : judgment and insight intact  o Mood and Affect  o : mood normal, affect appropriate          Assessment  · Encounter for examination following surgery     V67.00/Z09  · Bile leak     576.9/K83.9       Patient status post laparoscopic cholecystectomy with the likelihood of a bile leak.       Plan  · Orders  o HIDA scan (92189) - 576.9/K83.9 - 11/05/2020   [facility] on 11/05/20 at 12PM NOTHING TO EAT OR DRINK 4 HOURS PRIOR   · Medications  o Medications have been Reconciled  o Transition of Care or Provider Policy  · Instructions  o Electronically Identified Patient Education Materials Provided Electronically     I will go ahead and get the patient set up for a HIDA scan as soon as possible so we can evaluate the source of the bile leak. I did discuss with her that she may require an ERCP secondary to  this leak but it depends on the location of the leak. I discussed all of this with the patient. All questions were answered.  They voiced understanding and agreed to proceed with the above plan.             Electronically Signed by: Eryn Randolph-, -Author on November 4, 2020 03:21:22 PM  Electronically Co-signed by: Angel Esquivel MD -Reviewer on November 4, 2020 10:40:36 PM

## 2021-05-14 VITALS — WEIGHT: 248.37 LBS | BODY MASS INDEX: 38.98 KG/M2 | HEIGHT: 67 IN

## 2021-05-14 VITALS — HEIGHT: 67 IN | WEIGHT: 264 LBS | RESPIRATION RATE: 14 BRPM | BODY MASS INDEX: 41.44 KG/M2

## 2021-05-14 VITALS — WEIGHT: 264.44 LBS | BODY MASS INDEX: 41.51 KG/M2 | HEIGHT: 67 IN

## 2021-05-15 VITALS
HEIGHT: 67 IN | SYSTOLIC BLOOD PRESSURE: 147 MMHG | DIASTOLIC BLOOD PRESSURE: 88 MMHG | WEIGHT: 267 LBS | BODY MASS INDEX: 41.91 KG/M2

## 2021-05-18 ENCOUNTER — HOSPITAL ENCOUNTER (OUTPATIENT)
Dept: FAMILY MEDICINE CLINIC | Facility: CLINIC | Age: 61
Discharge: HOME | End: 2021-05-18
Attending: NURSE PRACTITIONER

## 2021-05-18 LAB
ALBUMIN SERPL-MCNC: 4.3 G/DL (ref 3.5–5)
ALBUMIN/GLOB SERPL: 1.4 {RATIO} (ref 1.4–2.6)
ALP SERPL-CCNC: 88 U/L (ref 53–141)
ALT SERPL-CCNC: 20 U/L (ref 10–40)
ANION GAP SERPL CALC-SCNC: 15 MMOL/L (ref 8–19)
AST SERPL-CCNC: 14 U/L (ref 15–50)
BILIRUB SERPL-MCNC: 0.38 MG/DL (ref 0.2–1.3)
BUN SERPL-MCNC: 13 MG/DL (ref 5–25)
BUN/CREAT SERPL: 12 {RATIO} (ref 6–20)
CALCIUM SERPL-MCNC: 9.9 MG/DL (ref 8.7–10.4)
CHLORIDE SERPL-SCNC: 104 MMOL/L (ref 99–111)
CHOLEST SERPL-MCNC: 182 MG/DL (ref 107–200)
CHOLEST/HDLC SERPL: 3.1 {RATIO} (ref 3–6)
CONV CO2: 27 MMOL/L (ref 22–32)
CONV TOTAL PROTEIN: 7.4 G/DL (ref 6.3–8.2)
CREAT UR-MCNC: 1.08 MG/DL (ref 0.5–0.9)
FERRITIN SERPL-MCNC: 56 NG/ML (ref 10–200)
GFR SERPLBLD BASED ON 1.73 SQ M-ARVRAT: 56 ML/MIN/{1.73_M2}
GLOBULIN UR ELPH-MCNC: 3.1 G/DL (ref 2–3.5)
GLUCOSE SERPL-MCNC: 81 MG/DL (ref 65–99)
HDLC SERPL-MCNC: 58 MG/DL (ref 40–60)
IRON SATN MFR SERPL: 35 % (ref 20–55)
IRON SERPL-MCNC: 108 UG/DL (ref 60–170)
LDLC SERPL CALC-MCNC: 104 MG/DL (ref 70–100)
OSMOLALITY SERPL CALC.SUM OF ELEC: 291 MOSM/KG (ref 273–304)
POTASSIUM SERPL-SCNC: 5.2 MMOL/L (ref 3.5–5.3)
SODIUM SERPL-SCNC: 141 MMOL/L (ref 135–147)
TIBC SERPL-MCNC: 313 UG/DL (ref 245–450)
TRANSFERRIN SERPL-MCNC: 219 MG/DL (ref 250–380)
TRIGL SERPL-MCNC: 100 MG/DL (ref 40–150)
VLDLC SERPL-MCNC: 20 MG/DL (ref 5–37)

## 2021-05-19 LAB — HCV AB SER DONR QL: <0.1 S/CO RATIO (ref 0–0.9)

## 2021-05-24 ENCOUNTER — OFFICE VISIT CONVERTED (OUTPATIENT)
Dept: FAMILY MEDICINE CLINIC | Facility: CLINIC | Age: 61
End: 2021-05-24
Attending: NURSE PRACTITIONER

## 2021-06-05 NOTE — PROGRESS NOTES
Progress Note      Patient Name: Marija Burgos   Patient ID: [id number]   Sex: Female   YOB: 1960    Primary Care Provider: Michelle VERNON   Referring Provider: Michelle VERNON    Visit Date: May 24, 2021    Provider: SAULO Mccann   Location: [facility]   Location Address: [address] [place] [address]   [place]  [address]   Location Phone: [phone]          Chief Complaint     refills       History Of Present Illness  Marija Burgos is a 61 year old /White female who presents for evaluation and treatment of:      follow up for refills    She also had labs done last week, ordered at her February visit.     CMP with cr 1.08 and eGFR 56; stable  Lipids: T100, C182, HDL 58, and   Iron profile improved; all normal except transferrin 219  Hep C ab negative    She has chronic pain secondary to osteoarthritis with hx of stage III CKD. Her OA mainly affects her hips, but she has it in several other joints as well. She has been prescribed/taken Tramadol for years - She is prescribed TID and for the most part takes TID on the days she works, but then only one or two on the days that she does not work. At the time of her last appointment she felt like it may not be working as well as it was; aching more than not. She wanted to try taking a Krill oil supplement to see if that would help. She states that between the krill oil and her weight loss efforts her joint pain is significantly improved. She has lost 40 pounds since last year at this time, and nearly 60 pounds since 2018. She has probably taken Tramadol for at least 20+ years.     HLD - prescribed Zetia in January - intolerant of statins - she has been doing well with the Zetia - no cramping    HTN - stable on medications - no c/o side effects with use.       Past Medical History  Disease Name Date Onset Notes   GERD (gastroesophageal reflux disease) --  --    High risk medications (not  anticoagulants) long-term use 05/12/2020 --    Hyperlipidemia --  --    Hypertension, Benign Essential --  --    Osteoarthritis 05/12/2020 --    Stage III chronic kidney disease 05/12/2020 --    Statin not tolerated 05/12/2020 --          Past Surgical History  Procedure Name Date Notes   Cholecystecomy 2020 --          Medication List  Name Date Started Instructions   lisinopril 20 mg oral tablet 11/20/2020 take 1 tablet (20 mg) by oral route once daily   Nexium 20 mg oral capsule,delayed release(DR/EC)  take 1 capsule (20 mg) by oral route once daily at least 1 hour before a meal swallowing whole. Do not crush or chew granules.   tramadol 50 mg oral tablet 02/25/2021 take 2 takes Q am PO, then 1 tab Q evening for OA pain   Zetia 10 mg oral tablet 02/25/2021 take 1 tablet (10 mg) by oral route once daily         Allergy List  Allergen Name Date Reaction Notes   atorvastatin --  aches --    Neosporin --  --  --        Allergies Reconciled  Family Medical History  Disease Name Relative/Age Notes   Arthrtis Mother/   Mother         Social History  Finding Status Start/Stop Quantity Notes   Alcohol Former --/-- --  drank alcohol in the past, 7 or less drinks per week   Exercises regularly --  --/-- --  0 times per week   Recreational Drug Use Never --/-- --  never used   Second hand smoke exposure Unknown --/-- --  no   Tobacco Former --/-- 0.5 PPD former smoker, smokes less than 1 pack per day, for 10 years, never uses other tobacco products   Uses seatbelts --  --/-- --  no         Review of Systems  · Constitutional  o Admits  o : weight loss, good general health lately  o Denies  o : fever, chills, body aches  · Eyes  o Denies  o : double vision, blurred vision, changes in vision  · HENT  o Denies  o : headaches, chronic sinus problem  · Cardiovascular  o Denies  o : chest pain, dyspnea on exertion, lower extremity edema, palpitations  · Respiratory  o Denies  o : shortness of breath,  "cough  · Gastrointestinal  o Denies  o : nausea, vomiting, diarrhea, abdominal pain  · Integument  o Denies  o : rash, pigmentation changes  · Neurologic  o Denies  o : tingling or numbness, dizziness  · Musculoskeletal  o Admits  o : joint pain, hip pain  o Denies  o : muscle cramps  · Endocrine  o Denies  o : polyuria, polydipsia, cold intolerance, heat intolerance      Vitals  Date Time BP Position Site L\R Cuff Size HR RR TEMP (F) WT  HT  BMI kg/m2 BSA m2 O2 Sat FR L/min FiO2 HC       05/02/2018 02:34 /80 Sitting    120 - R  97.6 276lbs 6oz    98 %      05/14/2019 01:43 /92 Sitting    114 - R  97.6 273lbs 6oz 5'  6\" 44.12 2.4 97 %  21%    02/13/2020 02:55 /84 Sitting    107 - R  97.8 260lbs 4oz    98 %  21%    02/25/2021 01:51 /76 Sitting    86 - R   238lbs 0oz 5'  4\" 40.85 2.21 97 %      05/24/2021 02:11 /84 Sitting    129 - R  98 221lbs 0oz    97 %            Physical Examination  · Constitutional  o Appearance  o : obese, well developed, alert, in no acute distress, well-tended appearance, no obvious deformities present  · Neck  o Inspection/Palpation  o : normal appearance, no masses or tenderness, trachea midline  o Thyroid  o : no thyromegaly  · Respiratory  o Respiratory Effort  o : breathing unlabored  o Auscultation of Lungs  o : clear to auscultation  · Cardiovascular  o Heart  o :   § Auscultation of Heart  § : regular rate, normal rhythm, no murmurs present  o Peripheral Vascular System  o :   § Carotid Arteries  § : normal pulses bilaterally, no bruits present  § Pedal Pulses  § : bilateral pulse strength 2+  § Extremities  § : no edema  · Lymphatic  o Neck  o : no lymphadenopathy present  · Musculoskeletal  o General  o :   § General Musculoskeletal  § : Muscle tone, strength, and development grossly normal.  · Skin and Subcutaneous Tissue  o General Inspection  o : no lesions present, no areas of discoloration, skin turgor normal, texture normal  · Neurologic  o Gait " and Station  o :   § Gait Screening  § : normal gait  · Psychiatric  o Mood and Affect  o : mood normal, affect appropriate          Assessment  · GERD (gastroesophageal reflux disease)     530.81/K21.9  · Hyperlipidemia     272.4/E78.5  · Osteoarthritis     715.90/M19.90  · High risk medications (not anticoagulants) long-term use     V58.69/Z79.899  · Stage III chronic kidney disease     585.3/N18.30  · Statin not tolerated     995.27/Z78.9  · Hypertension, Benign Essential     401.1/I10      Plan  · Orders  o ACO-39: Current medications updated and reviewed (1159F, ) - - 05/24/2021  · Medications  o lisinopril 20 mg oral tablet   SIG: take 1 tablet (20 mg) by oral route once daily   DISP: (90) Tablet with 1 refills  Refilled on 05/24/2021     o tramadol 50 mg oral tablet   SIG: take 2 takes Q am PO, then 1 tab Q evening for OA pain   DISP: (90) Tablet with 2 refills  Refilled on 05/24/2021     o Zetia 10 mg oral tablet   SIG: take 1 tablet (10 mg) by oral route once daily   DISP: (90) Tablet with 1 refills  Refilled on 05/24/2021     o Medications have been Reconciled  o Transition of Care or Provider Policy  · Instructions  o Obtained a written consent for STEPHANIE query. Discussed the risk and benefits of the use of controlled substances with the patient, including the risk of tolerance and drug dependence. The patient has been counseled on the need to have an exit strategy, including potentially discontinuing the use of controlled substances. STEPHANIE has or will be reviewed as soon as it becomes avaliable.  o Take all medications as prescribed/directed.  o Patient was educated/instructed on their diagnosis, treatment and medications prior to discharge from the clinic today.  o Chronic conditions reviewed and taken into consideration for today's treatment plan.  · Disposition  o Call or Return if symptoms worsen or persist.            Electronically Signed by: SAULO Mccann -Author on May 24, 2021  02:48:03 PM

## 2021-07-15 VITALS
HEART RATE: 129 BPM | OXYGEN SATURATION: 97 % | TEMPERATURE: 98 F | SYSTOLIC BLOOD PRESSURE: 130 MMHG | BODY MASS INDEX: 37.93 KG/M2 | WEIGHT: 221 LBS | DIASTOLIC BLOOD PRESSURE: 84 MMHG

## 2021-08-19 ENCOUNTER — OFFICE VISIT (OUTPATIENT)
Dept: FAMILY MEDICINE CLINIC | Facility: CLINIC | Age: 61
End: 2021-08-19

## 2021-08-19 VITALS
TEMPERATURE: 97.8 F | HEIGHT: 64 IN | OXYGEN SATURATION: 98 % | SYSTOLIC BLOOD PRESSURE: 154 MMHG | HEART RATE: 123 BPM | WEIGHT: 212 LBS | DIASTOLIC BLOOD PRESSURE: 94 MMHG | BODY MASS INDEX: 36.19 KG/M2

## 2021-08-19 DIAGNOSIS — Z79.899 HIGH RISK MEDICATIONS (NOT ANTICOAGULANTS) LONG-TERM USE: ICD-10-CM

## 2021-08-19 DIAGNOSIS — M15.9 PRIMARY OSTEOARTHRITIS INVOLVING MULTIPLE JOINTS: Primary | ICD-10-CM

## 2021-08-19 DIAGNOSIS — N18.31 STAGE 3A CHRONIC KIDNEY DISEASE (HCC): ICD-10-CM

## 2021-08-19 PROBLEM — M19.90 OSTEOARTHRITIS: Status: ACTIVE | Noted: 2020-05-12

## 2021-08-19 PROBLEM — E78.5 HYPERLIPIDEMIA: Status: ACTIVE | Noted: 2021-08-19

## 2021-08-19 PROBLEM — K21.9 GERD (GASTROESOPHAGEAL REFLUX DISEASE): Status: ACTIVE | Noted: 2021-08-19

## 2021-08-19 PROBLEM — I10 BENIGN ESSENTIAL HYPERTENSION: Status: ACTIVE | Noted: 2021-08-19

## 2021-08-19 PROBLEM — N18.30 STAGE III CHRONIC KIDNEY DISEASE: Status: ACTIVE | Noted: 2020-05-12

## 2021-08-19 PROBLEM — Z78.9 STATIN NOT TOLERATED: Status: ACTIVE | Noted: 2020-05-12

## 2021-08-19 PROCEDURE — 99214 OFFICE O/P EST MOD 30 MIN: CPT | Performed by: NURSE PRACTITIONER

## 2021-08-19 RX ORDER — DOCUSATE SODIUM 100 MG/1
100 CAPSULE, LIQUID FILLED ORAL 2 TIMES DAILY
COMMUNITY

## 2021-08-19 RX ORDER — EZETIMIBE 10 MG/1
10 TABLET ORAL DAILY
COMMUNITY
Start: 2021-07-14 | End: 2021-11-29 | Stop reason: SDUPTHER

## 2021-08-19 RX ORDER — CHLORAL HYDRATE 500 MG
CAPSULE ORAL
COMMUNITY
End: 2022-09-07

## 2021-08-19 RX ORDER — TRAMADOL HYDROCHLORIDE 50 MG/1
50 TABLET ORAL 3 TIMES DAILY PRN
Qty: 90 TABLET | Refills: 2 | Status: SHIPPED | OUTPATIENT
Start: 2021-08-19 | End: 2021-11-29 | Stop reason: SDUPTHER

## 2021-08-19 RX ORDER — TRAMADOL HYDROCHLORIDE 50 MG/1
50 TABLET ORAL 2 TIMES DAILY
COMMUNITY
Start: 2021-07-23 | End: 2021-08-19 | Stop reason: SDUPTHER

## 2021-08-19 RX ORDER — LISINOPRIL 20 MG/1
20 TABLET ORAL DAILY
COMMUNITY
Start: 2021-07-14 | End: 2021-11-29 | Stop reason: SDUPTHER

## 2021-08-19 NOTE — PROGRESS NOTES
Chief Complaint  Arthritis (refill tramadol )    Subjective            Marija Burgos presents to [facility]  History of Present Illness     She has chronic pain secondary to osteoarthritis with hx of stage III CKD - she has OA in hips, mainly, but her shoulders and arms ache as well. She has recently retired from [employer] - she was there for 34 years. She is actually going to go to work again, but this time at 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 She has been prescribed Tramadol for years. She is prescribed Tramadol TID, and she does generally take it TID, but when she is not working she may only take it once or twice per day. It really depends on activity. She has lost almost 50 pounds in the past year and that has seemed to alleviate her pain as well. She is taking Krill oil too, and she feels like that has helped as well.     PHQ-2 Total Score: 0      Past Medical History:   Diagnosis Date   • Benign essential hypertension    • GERD (gastroesophageal reflux disease)    • High risk medications (not anticoagulants) long-term use 2020   • HLD (hyperlipidemia)    • Osteoarthritis 2020   • Stage 3 chronic kidney disease (CMS/HCC) 2020   • Statin not tolerated 2020       Allergies   Allergen Reactions   • Atorvastatin Myalgia   • Neosporin [Bacitracin-Polymyxin B] Rash        Past Surgical History:   Procedure Laterality Date   • CHOLECYSTECTOMY  2020        Social History     Tobacco Use   • Smoking status: Former Smoker     Packs/day: 1.00     Years: 10.00     Pack years: 10.00     Types: Cigarettes   • Smokeless tobacco: Never Used   Substance Use Topics   • Alcohol use: Not Currently     Comment: Former   • Drug use: Never       Family History   Problem Relation Age of Onset   • Arthritis Mother         Health Maintenance Due   Topic Date Due   • MAMMOGRAM  Never done   • ANNUAL PHYSICAL  Never done   • TDAP/TD VACCINES (1 - Tdap) Never done   •  "ZOSTER VACCINE (1 of 2) Never done   • COLORECTAL CANCER SCREENING  02/06/2019   • PAP SMEAR  Never done        Current Outpatient Medications on File Prior to Visit   Medication Sig   • docusate sodium (COLACE) 100 MG capsule Take 100 mg by mouth 2 (Two) Times a Day.   • esomeprazole (nexIUM) 20 MG capsule Take 20 mg by mouth Daily.   • ezetimibe (ZETIA) 10 MG tablet Take 10 mg by mouth Daily.   • lisinopril (PRINIVIL,ZESTRIL) 20 MG tablet Take 20 mg by mouth Daily.   • Omega-3 Fatty Acids (fish oil) 1000 MG capsule capsule Take  by mouth Daily With Breakfast.   • [DISCONTINUED] traMADol (ULTRAM) 50 MG tablet Take 50 mg by mouth 2 (two) times a day. Take 2 tablets by mouth in the morning and 1 in the evening as needed for pain     No current facility-administered medications on file prior to visit.       Immunization History   Administered Date(s) Administered   • COVID-19 (MODERNA) 05/13/2021, 06/10/2021   • Influenza Quad Vaccine (Inpatient) 09/05/2017       Review of Systems     Objective     /94   Pulse (!) 123   Temp 97.8 °F (36.6 °C)   Ht 161.9 cm (63.75\")   Wt 96.2 kg (212 lb)   SpO2 98%   BMI 36.68 kg/m²       Physical Exam  Vitals reviewed.   Constitutional:       General: She is not in acute distress.     Appearance: Normal appearance. She is well-developed. She is obese.   HENT:      Head: Normocephalic and atraumatic.   Eyes:      General: No scleral icterus.  Neck:      Trachea: Trachea normal.   Cardiovascular:      Rate and Rhythm: Normal rate and regular rhythm.      Pulses: Normal pulses.      Heart sounds: No murmur heard.     Pulmonary:      Effort: Pulmonary effort is normal.      Breath sounds: Normal breath sounds. No wheezing or rhonchi.   Musculoskeletal:         General: Normal range of motion.      Right lower leg: No edema.      Left lower leg: No edema.   Skin:     General: Skin is warm and dry.   Neurological:      Mental Status: She is alert and oriented to person, place, " and time.   Psychiatric:         Mood and Affect: Mood and affect normal.         Behavior: Behavior normal.         Thought Content: Thought content normal.         Judgment: Judgment normal.         Result Review :     The following data was reviewed by: SAULO Scherer on 08/19/2021:    CMP    CMP 11/24/20 1/19/21 5/18/21   Glucose 92  81   BUN 22  13   Creatinine 1.29 (A)  1.08 (A)   Sodium 142  141   Potassium 5.3  5.2   Chloride 102  104   Calcium 10.0  9.9   Albumin 4.0 4.1 4.3   Total Bilirubin 0.27 0.25 0.38   Alkaline Phosphatase 165 (A) 96 88   AST (SGOT) 14 (A) 13 (A) 14 (A)   ALT (SGPT) 33 18 20   (A) Abnormal value            CBC    CBC 11/6/20 11/24/20 1/19/21   WBC 5.17 7.13 5.61   RBC 3.36 (A) 4.46 4.46   Hemoglobin 10.0 (A) 12.5 12.7   Hematocrit 31.5 (A) 40.4 40.0   MCV 93.8 90.6 89.7   MCH 29.8 28.0 28.5   MCHC 31.7 (A) 30.9 (A) 31.8 (A)   RDW 12.7 12.4 13.7   Platelets 256 431 (A) 301   (A) Abnormal value            Last Urine Toxicity     LAST URINE TOXICITY RESULTS Latest Ref Rng & Units 11/24/2020 11/12/2019    AMPHETAMINES SCREEN, URINE NA NEGATIVE NEGATIVE    BARBITURATES SCREEN NA NEGATIVE NEGATIVE    BENZODIAZEPINE SCREEN, URINE NA NEGATIVE NEGATIVE    COCAINE SCREEN, URINE NA NEGATIVE NEGATIVE    METHADONE SCREEN, URINE NA NEGATIVE NEGATIVE        Data reviewed: : STEPHANIE  reviewed and appropriate              Assessment and Plan      Diagnoses and all orders for this visit:    1. Primary osteoarthritis involving multiple joints (Primary)  -     traMADol (ULTRAM) 50 MG tablet; Take 1 tablet by mouth 3 (Three) Times a Day As Needed for Moderate Pain  or Severe Pain .  Dispense: 90 tablet; Refill: 2    2. Stage 3a chronic kidney disease (CMS/HCC)    3. High risk medications (not anticoagulants) long-term use            Follow Up     Return in about 3 months (around 11/19/2021) for Annual physical, Recheck.    Patient was given instructions and counseling regarding her condition or  for health maintenance advice. Please see specific information pulled into the AVS if appropriate.     Marija Burgos  reports that she has quit smoking. Her smoking use included cigarettes. She has a 10.00 pack-year smoking history. She has never used smokeless tobacco.

## 2021-11-20 DIAGNOSIS — M15.9 PRIMARY OSTEOARTHRITIS INVOLVING MULTIPLE JOINTS: ICD-10-CM

## 2021-11-22 RX ORDER — TRAMADOL HYDROCHLORIDE 50 MG/1
TABLET ORAL
Qty: 90 TABLET | OUTPATIENT
Start: 2021-11-22

## 2021-11-29 ENCOUNTER — OFFICE VISIT (OUTPATIENT)
Dept: FAMILY MEDICINE CLINIC | Facility: CLINIC | Age: 61
End: 2021-11-29

## 2021-11-29 VITALS
SYSTOLIC BLOOD PRESSURE: 132 MMHG | TEMPERATURE: 96.4 F | BODY MASS INDEX: 40.31 KG/M2 | DIASTOLIC BLOOD PRESSURE: 80 MMHG | OXYGEN SATURATION: 98 % | WEIGHT: 233 LBS | HEART RATE: 107 BPM

## 2021-11-29 DIAGNOSIS — N18.31 STAGE 3A CHRONIC KIDNEY DISEASE (HCC): ICD-10-CM

## 2021-11-29 DIAGNOSIS — K21.9 GASTROESOPHAGEAL REFLUX DISEASE WITHOUT ESOPHAGITIS: ICD-10-CM

## 2021-11-29 DIAGNOSIS — E78.2 MIXED HYPERLIPIDEMIA: ICD-10-CM

## 2021-11-29 DIAGNOSIS — I10 BENIGN ESSENTIAL HYPERTENSION: Primary | ICD-10-CM

## 2021-11-29 DIAGNOSIS — Z28.21 INFLUENZA VACCINATION DECLINED: ICD-10-CM

## 2021-11-29 DIAGNOSIS — Z79.899 HIGH RISK MEDICATIONS (NOT ANTICOAGULANTS) LONG-TERM USE: ICD-10-CM

## 2021-11-29 DIAGNOSIS — Z12.31 BREAST CANCER SCREENING BY MAMMOGRAM: ICD-10-CM

## 2021-11-29 DIAGNOSIS — M15.9 PRIMARY OSTEOARTHRITIS INVOLVING MULTIPLE JOINTS: ICD-10-CM

## 2021-11-29 DIAGNOSIS — Z78.9 STATIN NOT TOLERATED: ICD-10-CM

## 2021-11-29 PROCEDURE — 99214 OFFICE O/P EST MOD 30 MIN: CPT | Performed by: NURSE PRACTITIONER

## 2021-11-29 RX ORDER — LISINOPRIL 20 MG/1
20 TABLET ORAL DAILY
Qty: 90 TABLET | Refills: 1 | Status: SHIPPED | OUTPATIENT
Start: 2021-11-29 | End: 2022-05-25

## 2021-11-29 RX ORDER — TRAMADOL HYDROCHLORIDE 50 MG/1
50 TABLET ORAL 3 TIMES DAILY PRN
Qty: 90 TABLET | Refills: 2 | Status: SHIPPED | OUTPATIENT
Start: 2021-11-29 | End: 2022-03-02 | Stop reason: SDUPTHER

## 2021-11-29 RX ORDER — EZETIMIBE 10 MG/1
10 TABLET ORAL DAILY
Qty: 90 TABLET | Refills: 1 | Status: SHIPPED | OUTPATIENT
Start: 2021-11-29 | End: 2022-05-25

## 2021-11-29 NOTE — PROGRESS NOTES
"Chief Complaint  Arthritis (refill tramadol )    Subjective            Marija Burgos presents to [facility]  History of Present Illness     Follow up on chronic health conditions and medication refills     Hypertension, currently prescribed lisinopril 20 mg once daily.  Blood pressure is within normal limits on exam today.  No complaints of chest pain, palpitations, headaches, dizziness, shortness of breath, or swelling in her legs.  No complaints of dry cough with use of lisinopril.    Dyslipidemia with prior statin intolerance, currently prescribed Zetia.  No issues with Zetia.    She does have GERD, and she is taking Nexium 20 mg over-the-counter daily.  No complaints of dysphagia.  No nausea, vomiting, abdominal pain, constipation, diarrhea, melena, or bright red blood per rectum.    She needs a refill of tramadol.  She has osteoarthritis of multiple joints.  She additionally has stage III chronic kidney disease and cannot take NSAIDs.  Her osteoarthritis mainly affects her hips, but also her shoulders and arms.  She has taken tramadol for years.  Denies any side effects.  Her current prescription is working well to control her pain.  She takes 50 mg 3 times daily as needed.  She states that she has been out of her medication for the past 2 days and she can certainly tell that it is missing.    She states that she has not had a mammogram in at least 10 years.  She does not really want to have a mammogram.  She declines a breast exam.  She denies any issues with her breast at present.  She denies a family history of breast cancer.  She states \"you can order a mammogram and may be I will get around to doing it\".      Past Medical History:   Diagnosis Date   • Benign essential hypertension    • GERD (gastroesophageal reflux disease)    • High risk medications (not anticoagulants) long-term use 2020   • HLD (hyperlipidemia)    • Osteoarthritis 2020   • Stage 3 chronic kidney " disease (HCC) 2020   • Statin not tolerated 2020       Allergies   Allergen Reactions   • Atorvastatin Myalgia   • Neosporin [Bacitracin-Polymyxin B] Rash        Past Surgical History:   Procedure Laterality Date   • CHOLECYSTECTOMY  2020        Social History     Tobacco Use   • Smoking status: Former Smoker     Packs/day: 1.00     Years: 10.00     Pack years: 10.00     Types: Cigarettes   • Smokeless tobacco: Never Used   Substance Use Topics   • Alcohol use: Not Currently     Comment: Former   • Drug use: Never       Family History   Problem Relation Age of Onset   • Arthritis Mother         Health Maintenance Due   Topic Date Due   • MAMMOGRAM  Never done   • ANNUAL PHYSICAL  Never done   • TDAP/TD VACCINES (1 - Tdap) Never done   • ZOSTER VACCINE (1 of 2) Never done   • PAP SMEAR  Never done        Current Outpatient Medications on File Prior to Visit   Medication Sig   • docusate sodium (COLACE) 100 MG capsule Take 100 mg by mouth 2 (Two) Times a Day.   • esomeprazole (nexIUM) 20 MG capsule Take 20 mg by mouth Daily.   • Omega-3 Fatty Acids (fish oil) 1000 MG capsule capsule Take  by mouth Daily With Breakfast.   • [DISCONTINUED] ezetimibe (ZETIA) 10 MG tablet Take 10 mg by mouth Daily.   • [DISCONTINUED] lisinopril (PRINIVIL,ZESTRIL) 20 MG tablet Take 20 mg by mouth Daily.   • [DISCONTINUED] traMADol (ULTRAM) 50 MG tablet Take 1 tablet by mouth 3 (Three) Times a Day As Needed for Moderate Pain  or Severe Pain .     No current facility-administered medications on file prior to visit.       Immunization History   Administered Date(s) Administered   • COVID-19 (MODERNA) 1st, 2nd, 3rd Dose Only 05/13/2021, 06/10/2021   • Influenza Quad Vaccine (Inpatient) 09/05/2017       Review of Systems     Objective     /80 (BP Location: Left arm, Patient Position: Sitting)   Pulse 107   Temp 96.4 °F (35.8 °C)   Wt 106 kg (233 lb)   SpO2 98%   BMI 40.31 kg/m²       Physical Exam  Vitals reviewed.   Constitutional:        General: She is not in acute distress.     Appearance: Normal appearance. She is well-developed. She is obese.   HENT:      Head: Normocephalic and atraumatic.   Eyes:      General: No scleral icterus.     Conjunctiva/sclera: Conjunctivae normal.   Neck:      Thyroid: No thyroid mass, thyromegaly or thyroid tenderness.      Vascular: No carotid bruit.      Trachea: Trachea normal.   Cardiovascular:      Rate and Rhythm: Normal rate and regular rhythm.      Pulses: Normal pulses.      Heart sounds: No murmur heard.      Pulmonary:      Effort: Pulmonary effort is normal.      Breath sounds: Normal breath sounds. No wheezing or rhonchi.   Chest:      Comments: Breast exam deferred by patient.  Musculoskeletal:         General: Normal range of motion.      Cervical back: Normal range of motion and neck supple.      Right lower leg: No edema.      Left lower leg: No edema.   Lymphadenopathy:      Cervical: No cervical adenopathy.   Skin:     General: Skin is warm and dry.   Neurological:      Mental Status: She is alert and oriented to person, place, and time.   Psychiatric:         Mood and Affect: Mood and affect normal.         Behavior: Behavior normal.         Thought Content: Thought content normal.         Judgment: Judgment normal.         Result Review :         CMP    CMP 1/19/21 5/18/21   Glucose  81   BUN  13   Creatinine  1.08 (A)   Sodium  141   Potassium  5.2   Chloride  104   Calcium  9.9   Albumin 4.1 4.3   Total Bilirubin 0.25 0.38   Alkaline Phosphatase 96 88   AST (SGOT) 13 (A) 14 (A)   ALT (SGPT) 18 20   (A) Abnormal value            CBC    CBC 1/19/21   WBC 5.61   RBC 4.46   Hemoglobin 12.7   Hematocrit 40.0   MCV 89.7   MCH 28.5   MCHC 31.8 (A)   RDW 13.7   Platelets 301   (A) Abnormal value            Lipid Panel    Lipid Panel 5/18/21   Total Cholesterol 182   Triglycerides 100   HDL Cholesterol 58   VLDL Cholesterol 20   LDL Cholesterol  104 (A)   (A) Abnormal value       Comments are  available for some flowsheets but are not being displayed.           Last Urine Toxicity     LAST URINE TOXICITY RESULTS Latest Ref Rng & Units 11/24/2020 11/12/2019    AMPHETAMINES SCREEN, URINE NA NEGATIVE NEGATIVE    BARBITURATES SCREEN NA NEGATIVE NEGATIVE    BENZODIAZEPINE SCREEN, URINE NA NEGATIVE NEGATIVE    COCAINE SCREEN, URINE NA NEGATIVE NEGATIVE    METHADONE SCREEN, URINE NA NEGATIVE NEGATIVE                    Assessment and Plan      Diagnoses and all orders for this visit:    1. Benign essential hypertension (Primary)  -     CBC Auto Differential  -     Comprehensive Metabolic Panel  -     TSH+Free T4  -     Microalbumin / Creatinine Urine Ratio - Urine, Clean Catch  -     lisinopril (PRINIVIL,ZESTRIL) 20 MG tablet; Take 1 tablet by mouth Daily.  Dispense: 90 tablet; Refill: 1    2. Mixed hyperlipidemia  -     Lipid Panel  -     ezetimibe (ZETIA) 10 MG tablet; Take 1 tablet by mouth Daily.  Dispense: 90 tablet; Refill: 1    3. Statin not tolerated    4. Gastroesophageal reflux disease without esophagitis    5. Primary osteoarthritis involving multiple joints  -     traMADol (ULTRAM) 50 MG tablet; Take 1 tablet by mouth 3 (Three) Times a Day As Needed for Moderate Pain  or Severe Pain .  Dispense: 90 tablet; Refill: 2    6. Stage 3a chronic kidney disease (HCC)    7. High risk medications (not anticoagulants) long-term use  -     Urine Drug Screen - Urine, Clean Catch    8. Breast cancer screening by mammogram  -     Mammo Screening Bilateral With CAD; Future    9. Influenza vaccination declined            Follow Up     Return in about 3 months (around 2/28/2022) for Next scheduled follow up. Annual physical exam encouraged.    Mammogram ordered today. She will RTC fasting for labs.     Patient was given instructions and counseling regarding her condition or for health maintenance advice. Please see specific information pulled into the AVS if appropriate.     Marija Burgos  reports that she has quit  smoking. Her smoking use included cigarettes. She has a 10.00 pack-year smoking history. She has never used smokeless tobacco.

## 2021-12-08 ENCOUNTER — APPOINTMENT (OUTPATIENT)
Dept: MAMMOGRAPHY | Facility: HOSPITAL | Age: 61
End: 2021-12-08

## 2022-01-11 ENCOUNTER — APPOINTMENT (OUTPATIENT)
Dept: MAMMOGRAPHY | Facility: HOSPITAL | Age: 62
End: 2022-01-11

## 2022-01-31 ENCOUNTER — LAB (OUTPATIENT)
Dept: LAB | Facility: HOSPITAL | Age: 62
End: 2022-01-31

## 2022-01-31 ENCOUNTER — HOSPITAL ENCOUNTER (OUTPATIENT)
Dept: MAMMOGRAPHY | Facility: HOSPITAL | Age: 62
Discharge: HOME | End: 2022-01-31
Admitting: NURSE PRACTITIONER

## 2022-01-31 DIAGNOSIS — Z12.31 BREAST CANCER SCREENING BY MAMMOGRAM: ICD-10-CM

## 2022-01-31 LAB
ALBUMIN SERPL-MCNC: 4.6 G/DL (ref 3.5–5.2)
ALBUMIN UR-MCNC: 6.4 MG/DL
ALBUMIN/GLOB SERPL: 1.4 G/DL
ALP SERPL-CCNC: 83 U/L (ref 39–117)
ALT SERPL W P-5'-P-CCNC: 21 U/L (ref 1–33)
AMPHET+METHAMPHET UR QL: NEGATIVE
ANION GAP SERPL CALCULATED.3IONS-SCNC: 11.7 MMOL/L (ref 5–15)
AST SERPL-CCNC: 15 U/L (ref 1–32)
BARBITURATES UR QL SCN: NEGATIVE
BASOPHILS # BLD AUTO: 0.03 10*3/MM3 (ref 0–0.2)
BASOPHILS NFR BLD AUTO: 0.5 % (ref 0–1.5)
BENZODIAZ UR QL SCN: NEGATIVE
BILIRUB SERPL-MCNC: 0.5 MG/DL (ref 0–1.2)
BUN SERPL-MCNC: 17 MG/DL (ref 8–23)
BUN/CREAT SERPL: 15.5 (ref 7–25)
CALCIUM SPEC-SCNC: 10.2 MG/DL (ref 8.6–10.5)
CANNABINOIDS SERPL QL: NEGATIVE
CHLORIDE SERPL-SCNC: 101 MMOL/L (ref 98–107)
CHOLEST SERPL-MCNC: 201 MG/DL (ref 0–200)
CO2 SERPL-SCNC: 26.3 MMOL/L (ref 22–29)
COCAINE UR QL: NEGATIVE
CREAT SERPL-MCNC: 1.1 MG/DL (ref 0.57–1)
CREAT UR-MCNC: 112.6 MG/DL
DEPRECATED RDW RBC AUTO: 41.3 FL (ref 37–54)
EOSINOPHIL # BLD AUTO: 0.17 10*3/MM3 (ref 0–0.4)
EOSINOPHIL NFR BLD AUTO: 3 % (ref 0.3–6.2)
ERYTHROCYTE [DISTWIDTH] IN BLOOD BY AUTOMATED COUNT: 12.7 % (ref 12.3–15.4)
GFR SERPL CREATININE-BSD FRML MDRD: 51 ML/MIN/1.73
GLOBULIN UR ELPH-MCNC: 3.2 GM/DL
GLUCOSE SERPL-MCNC: 95 MG/DL (ref 65–99)
HCT VFR BLD AUTO: 45.9 % (ref 34–46.6)
HDLC SERPL-MCNC: 48 MG/DL (ref 40–60)
HGB BLD-MCNC: 15.7 G/DL (ref 12–15.9)
IMM GRANULOCYTES # BLD AUTO: 0.02 10*3/MM3 (ref 0–0.05)
IMM GRANULOCYTES NFR BLD AUTO: 0.4 % (ref 0–0.5)
LDLC SERPL CALC-MCNC: 129 MG/DL (ref 0–100)
LDLC/HDLC SERPL: 2.63 {RATIO}
LYMPHOCYTES # BLD AUTO: 2.45 10*3/MM3 (ref 0.7–3.1)
LYMPHOCYTES NFR BLD AUTO: 43.9 % (ref 19.6–45.3)
MCH RBC QN AUTO: 30.5 PG (ref 26.6–33)
MCHC RBC AUTO-ENTMCNC: 34.2 G/DL (ref 31.5–35.7)
MCV RBC AUTO: 89.1 FL (ref 79–97)
METHADONE UR QL SCN: NEGATIVE
MICROALBUMIN/CREAT UR: 56.8 MG/G
MONOCYTES # BLD AUTO: 0.4 10*3/MM3 (ref 0.1–0.9)
MONOCYTES NFR BLD AUTO: 7.2 % (ref 5–12)
NEUTROPHILS NFR BLD AUTO: 2.51 10*3/MM3 (ref 1.7–7)
NEUTROPHILS NFR BLD AUTO: 45 % (ref 42.7–76)
NRBC BLD AUTO-RTO: 0 /100 WBC (ref 0–0.2)
OPIATES UR QL: NEGATIVE
OXYCODONE UR QL SCN: NEGATIVE
PLATELET # BLD AUTO: 265 10*3/MM3 (ref 140–450)
PMV BLD AUTO: 10.2 FL (ref 6–12)
POTASSIUM SERPL-SCNC: 4.4 MMOL/L (ref 3.5–5.2)
PROT SERPL-MCNC: 7.8 G/DL (ref 6–8.5)
RBC # BLD AUTO: 5.15 10*6/MM3 (ref 3.77–5.28)
SODIUM SERPL-SCNC: 139 MMOL/L (ref 136–145)
T4 FREE SERPL-MCNC: 1.27 NG/DL (ref 0.93–1.7)
TRIGL SERPL-MCNC: 134 MG/DL (ref 0–150)
TSH SERPL DL<=0.05 MIU/L-ACNC: 5.49 UIU/ML (ref 0.27–4.2)
VLDLC SERPL-MCNC: 24 MG/DL (ref 5–40)
WBC NRBC COR # BLD: 5.58 10*3/MM3 (ref 3.4–10.8)

## 2022-01-31 PROCEDURE — 82570 ASSAY OF URINE CREATININE: CPT | Performed by: NURSE PRACTITIONER

## 2022-01-31 PROCEDURE — 80307 DRUG TEST PRSMV CHEM ANLYZR: CPT | Performed by: NURSE PRACTITIONER

## 2022-01-31 PROCEDURE — 84439 ASSAY OF FREE THYROXINE: CPT | Performed by: NURSE PRACTITIONER

## 2022-01-31 PROCEDURE — 80053 COMPREHEN METABOLIC PANEL: CPT | Performed by: NURSE PRACTITIONER

## 2022-01-31 PROCEDURE — 77067 SCR MAMMO BI INCL CAD: CPT

## 2022-01-31 PROCEDURE — 84443 ASSAY THYROID STIM HORMONE: CPT | Performed by: NURSE PRACTITIONER

## 2022-01-31 PROCEDURE — 80061 LIPID PANEL: CPT | Performed by: NURSE PRACTITIONER

## 2022-01-31 PROCEDURE — 82043 UR ALBUMIN QUANTITATIVE: CPT | Performed by: NURSE PRACTITIONER

## 2022-01-31 PROCEDURE — 85025 COMPLETE CBC W/AUTO DIFF WBC: CPT | Performed by: NURSE PRACTITIONER

## 2022-02-28 DIAGNOSIS — M15.9 PRIMARY OSTEOARTHRITIS INVOLVING MULTIPLE JOINTS: ICD-10-CM

## 2022-03-01 RX ORDER — TRAMADOL HYDROCHLORIDE 50 MG/1
TABLET ORAL
Qty: 90 TABLET | OUTPATIENT
Start: 2022-03-01

## 2022-03-02 ENCOUNTER — OFFICE VISIT (OUTPATIENT)
Dept: FAMILY MEDICINE CLINIC | Facility: CLINIC | Age: 62
End: 2022-03-02

## 2022-03-02 VITALS
DIASTOLIC BLOOD PRESSURE: 88 MMHG | TEMPERATURE: 97.1 F | OXYGEN SATURATION: 100 % | BODY MASS INDEX: 37.32 KG/M2 | HEIGHT: 65 IN | SYSTOLIC BLOOD PRESSURE: 138 MMHG | WEIGHT: 224 LBS | HEART RATE: 128 BPM

## 2022-03-02 DIAGNOSIS — M15.9 PRIMARY OSTEOARTHRITIS INVOLVING MULTIPLE JOINTS: Primary | ICD-10-CM

## 2022-03-02 DIAGNOSIS — Z79.899 HIGH RISK MEDICATIONS (NOT ANTICOAGULANTS) LONG-TERM USE: ICD-10-CM

## 2022-03-02 DIAGNOSIS — Z23 NEED FOR SHINGLES VACCINE: ICD-10-CM

## 2022-03-02 PROCEDURE — 99214 OFFICE O/P EST MOD 30 MIN: CPT | Performed by: NURSE PRACTITIONER

## 2022-03-02 RX ORDER — TRAMADOL HYDROCHLORIDE 50 MG/1
50 TABLET ORAL 3 TIMES DAILY PRN
Qty: 90 TABLET | Refills: 2 | Status: SHIPPED | OUTPATIENT
Start: 2022-03-02 | End: 2022-06-08

## 2022-03-02 NOTE — PROGRESS NOTES
"Chief Complaint  Pain (refill tramadol )    Subjective            Marija Burgos presents to [facility]  History of Present Illness     Patient presents to the office today for 3-month follow-up/refill tramadol.  She is prescribed tramadol 50 mg 3 times daily as needed for treatment of osteoarthritis of multiple joints.  She has stage III chronic kidney disease and is unable to take NSAIDs.  Her pain is not well controlled with Tylenol exclusively.  Her osteoarthritis affects her hips mainly, but also her shoulders and arms.  She has been prescribed tramadol for many years and denies any adverse side effects.  She exhibits no signs or symptoms of abuse or addiction.  She states medication is working well to control her pain.    She reports \"finally\" having Covid about a month ago.  She knew she had it because she lost her taste and smell.  Even after her 10-day quarantine she continued to show positive on her test.  She is currently asymptomatic.    She would like to get a shingles vaccination if possible.  She has had shingles twice in her lifetime thus far.    PHQ-2 Total Score: 0    Past Medical History:   Diagnosis Date   • Benign essential hypertension    • GERD (gastroesophageal reflux disease)    • High risk medications (not anticoagulants) long-term use 2020   • HLD (hyperlipidemia)    • Osteoarthritis 2020   • Stage 3 chronic kidney disease (HCC) 2020   • Statin not tolerated 2020       Allergies   Allergen Reactions   • Atorvastatin Myalgia   • Neosporin [Bacitracin-Polymyxin B] Rash        Past Surgical History:   Procedure Laterality Date   • CHOLECYSTECTOMY  2020        Social History     Tobacco Use   • Smoking status: Former Smoker     Packs/day: 1.00     Years: 10.00     Pack years: 10.00     Types: Cigarettes   • Smokeless tobacco: Never Used   Vaping Use   • Vaping Use: Never used   Substance Use Topics   • Alcohol use: Not Currently     Comment: Former   • Drug " "use: Never       Family History   Problem Relation Age of Onset   • Arthritis Mother         Health Maintenance Due   Topic Date Due   • ANNUAL PHYSICAL  Never done   • TDAP/TD VACCINES (1 - Tdap) Never done   • ZOSTER VACCINE (1 of 2) Never done   • PAP SMEAR  Never done   • COVID-19 Vaccine (3 - Booster for Moderna series) 11/10/2021        Current Outpatient Medications on File Prior to Visit   Medication Sig   • docusate sodium (COLACE) 100 MG capsule Take 100 mg by mouth 2 (Two) Times a Day.   • esomeprazole (nexIUM) 20 MG capsule Take 20 mg by mouth Daily.   • ezetimibe (ZETIA) 10 MG tablet Take 1 tablet by mouth Daily.   • lisinopril (PRINIVIL,ZESTRIL) 20 MG tablet Take 1 tablet by mouth Daily.   • [DISCONTINUED] traMADol (ULTRAM) 50 MG tablet Take 1 tablet by mouth 3 (Three) Times a Day As Needed for Moderate Pain  or Severe Pain .   • Omega-3 Fatty Acids (fish oil) 1000 MG capsule capsule Take  by mouth Daily With Breakfast.     No current facility-administered medications on file prior to visit.       Immunization History   Administered Date(s) Administered   • COVID-19 (MODERNA) 1st, 2nd, 3rd Dose Only 05/13/2021, 06/10/2021   • Influenza Quad Vaccine (Inpatient) 09/05/2017       Review of Systems     Objective     /88   Pulse (!) 128   Temp 97.1 °F (36.2 °C)   Ht 165.1 cm (65\")   Wt 102 kg (224 lb)   SpO2 100%   BMI 37.28 kg/m²       Physical Exam  Vitals reviewed.   Constitutional:       General: She is not in acute distress.     Appearance: She is well-developed. She is obese.   HENT:      Head: Normocephalic and atraumatic.   Eyes:      General: No scleral icterus.     Conjunctiva/sclera: Conjunctivae normal.   Cardiovascular:      Rate and Rhythm: Normal rate and regular rhythm.      Pulses: Normal pulses.      Heart sounds: No murmur heard.      Pulmonary:      Effort: Pulmonary effort is normal.      Breath sounds: Normal breath sounds. No wheezing, rhonchi or rales.   Musculoskeletal:  "        General: Normal range of motion.      Right lower leg: No edema.      Left lower leg: No edema.   Skin:     General: Skin is warm and dry.   Neurological:      Mental Status: She is alert and oriented to person, place, and time.   Psychiatric:         Mood and Affect: Mood and affect normal.         Behavior: Behavior normal.         Thought Content: Thought content normal.         Judgment: Judgment normal.         Result Review :     The following data was reviewed by: SAULO Scherer on 03/02/2022:    Urine Drug Screen - Urine, Clean Catch (01/31/2022 09:17)                Assessment and Plan      Diagnoses and all orders for this visit:    1. Primary osteoarthritis involving multiple joints (Primary)  -     traMADol (ULTRAM) 50 MG tablet; Take 1 tablet by mouth 3 (Three) Times a Day As Needed for Moderate Pain  or Severe Pain .  Dispense: 90 tablet; Refill: 2    2. High risk medications (not anticoagulants) long-term use  -     traMADol (ULTRAM) 50 MG tablet; Take 1 tablet by mouth 3 (Three) Times a Day As Needed for Moderate Pain  or Severe Pain .  Dispense: 90 tablet; Refill: 2    3. Need for shingles vaccine  -     Zoster Vac Recomb Adjuvanted 50 MCG/0.5ML reconstituted suspension; Inject 0.5 mL into the appropriate muscle as directed by prescriber 1 (One) Time for 1 dose.  Dispense: 1 each; Refill: 0            Follow Up     Return in about 3 months (around 6/2/2022) for Next scheduled follow up.    Patient was given instructions and counseling regarding her condition or for health maintenance advice. Please see specific information pulled into the AVS if appropriate.

## 2022-05-25 DIAGNOSIS — E78.2 MIXED HYPERLIPIDEMIA: ICD-10-CM

## 2022-05-25 DIAGNOSIS — I10 BENIGN ESSENTIAL HYPERTENSION: ICD-10-CM

## 2022-05-25 RX ORDER — EZETIMIBE 10 MG/1
TABLET ORAL
Qty: 90 TABLET | Refills: 0 | Status: SHIPPED | OUTPATIENT
Start: 2022-05-25 | End: 2022-08-23

## 2022-05-25 RX ORDER — LISINOPRIL 20 MG/1
TABLET ORAL
Qty: 90 TABLET | Refills: 0 | Status: SHIPPED | OUTPATIENT
Start: 2022-05-25 | End: 2022-08-23

## 2022-06-07 DIAGNOSIS — Z79.899 HIGH RISK MEDICATIONS (NOT ANTICOAGULANTS) LONG-TERM USE: ICD-10-CM

## 2022-06-07 DIAGNOSIS — M15.9 PRIMARY OSTEOARTHRITIS INVOLVING MULTIPLE JOINTS: ICD-10-CM

## 2022-06-08 RX ORDER — TRAMADOL HYDROCHLORIDE 50 MG/1
TABLET ORAL
Qty: 9 TABLET | Refills: 0 | Status: SHIPPED | OUTPATIENT
Start: 2022-06-08 | End: 2022-06-13 | Stop reason: SDUPTHER

## 2022-06-13 ENCOUNTER — OFFICE VISIT (OUTPATIENT)
Dept: FAMILY MEDICINE CLINIC | Facility: CLINIC | Age: 62
End: 2022-06-13

## 2022-06-13 VITALS
BODY MASS INDEX: 40.44 KG/M2 | HEART RATE: 99 BPM | TEMPERATURE: 97.2 F | DIASTOLIC BLOOD PRESSURE: 80 MMHG | OXYGEN SATURATION: 96 % | SYSTOLIC BLOOD PRESSURE: 120 MMHG | WEIGHT: 243 LBS

## 2022-06-13 DIAGNOSIS — Z79.899 HIGH RISK MEDICATIONS (NOT ANTICOAGULANTS) LONG-TERM USE: ICD-10-CM

## 2022-06-13 DIAGNOSIS — M15.9 PRIMARY OSTEOARTHRITIS INVOLVING MULTIPLE JOINTS: Primary | ICD-10-CM

## 2022-06-13 DIAGNOSIS — R94.6 ABNORMAL FINDING ON THYROID FUNCTION TEST: ICD-10-CM

## 2022-06-13 LAB
T4 FREE SERPL-MCNC: 1.16 NG/DL (ref 0.93–1.7)
TSH SERPL DL<=0.05 MIU/L-ACNC: 3.82 UIU/ML (ref 0.27–4.2)

## 2022-06-13 PROCEDURE — 99214 OFFICE O/P EST MOD 30 MIN: CPT | Performed by: NURSE PRACTITIONER

## 2022-06-13 PROCEDURE — 84439 ASSAY OF FREE THYROXINE: CPT | Performed by: NURSE PRACTITIONER

## 2022-06-13 PROCEDURE — 84443 ASSAY THYROID STIM HORMONE: CPT | Performed by: NURSE PRACTITIONER

## 2022-06-13 RX ORDER — TRAMADOL HYDROCHLORIDE 50 MG/1
50 TABLET ORAL 3 TIMES DAILY PRN
Qty: 90 TABLET | Refills: 2 | Status: SHIPPED | OUTPATIENT
Start: 2022-06-13 | End: 2022-09-13 | Stop reason: SDUPTHER

## 2022-06-13 NOTE — PROGRESS NOTES
Venipuncture Blood Specimen Collection  Venipuncture performed in left arm by Maddie Ervin with good hemostasis. Patient tolerated the procedure well without complications.   06/13/22   Maddie Ervin

## 2022-06-13 NOTE — PROGRESS NOTES
Chief Complaint  Pain (Refill tramadol )    Subjective            Marija Burgos presents to [facility]  History of Present Illness     Patient presents to the office today for follow-up for tramadol.    She currently takes tramadol 50 mg 3 times daily for treatment of osteoarthritis of multiple joints.  Mainly, her hips, shoulders, and arms are affected.  She has been taking this dose for several years.  She cannot take NSAIDs secondary to stage III chronic kidney disease.  Pain is not well controlled with Tylenol alone.  She denies any adverse side effects.  She exhibits no signs or symptoms of abuse or addiction.  Medication continues to work well for pain control.      She retired last year from her job; however, starting next week she is going to go back to work part-time.  She states that she feels lost without working.  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  She told them so long as she could work part-time and have 4 days off in a row, then she would work.  She has a grandchild in [place] that she is very active with, hence requiring 4 days off in a row.    She last had lab work in January of this year.  Labs look good with the exception of her thyroid.  She has not returned for follow-up testing.    PHQ-2 Total Score: 0      Past Medical History:   Diagnosis Date   • Benign essential hypertension    • GERD (gastroesophageal reflux disease)    • High risk medications (not anticoagulants) long-term use 2020   • HLD (hyperlipidemia)    • Osteoarthritis 2020   • Stage 3 chronic kidney disease (HCC) 2020   • Statin not tolerated 2020       Allergies   Allergen Reactions   • Atorvastatin Myalgia   • Neosporin [Bacitracin-Polymyxin B] Rash        Past Surgical History:   Procedure Laterality Date   • CHOLECYSTECTOMY  2020        Social History     Tobacco Use   • Smoking status: Former Smoker     Packs/day: 1.00     Years: 10.00     Pack years:  10.00     Types: Cigarettes   • Smokeless tobacco: Never Used   Vaping Use   • Vaping Use: Never used   Substance Use Topics   • Alcohol use: Not Currently     Comment: Former   • Drug use: Never       Family History   Problem Relation Age of Onset   • Arthritis Mother         Health Maintenance Due   Topic Date Due   • ANNUAL PHYSICAL  Never done   • TDAP/TD VACCINES (1 - Tdap) Never done   • ZOSTER VACCINE (1 of 2) Never done   • PAP SMEAR  Never done   • COVID-19 Vaccine (3 - Booster for Moderna series) 11/10/2021        Current Outpatient Medications on File Prior to Visit   Medication Sig   • docusate sodium (COLACE) 100 MG capsule Take 100 mg by mouth 2 (Two) Times a Day.   • esomeprazole (nexIUM) 20 MG capsule Take 20 mg by mouth Daily.   • ezetimibe (ZETIA) 10 MG tablet TAKE ONE TABLET BY MOUTH DAILY   • lisinopril (PRINIVIL,ZESTRIL) 20 MG tablet TAKE ONE TABLET BY MOUTH DAILY   • Omega-3 Fatty Acids (fish oil) 1000 MG capsule capsule Take  by mouth Daily With Breakfast.   • [DISCONTINUED] traMADol (ULTRAM) 50 MG tablet TAKE ONE TABLET BY MOUTH THREE TIMES A DAY AS NEEDED FOR MODERATE PAIN OR SEVERE PAIN     No current facility-administered medications on file prior to visit.       Immunization History   Administered Date(s) Administered   • COVID-19 (MODERNA) 1st, 2nd, 3rd Dose Only 05/13/2021, 06/10/2021   • Influenza Quad Vaccine (Inpatient) 09/05/2017       Review of Systems     Objective     /80   Pulse 99   Temp 97.2 °F (36.2 °C)   Wt 110 kg (243 lb)   SpO2 96%   BMI 40.44 kg/m²       Physical Exam  Vitals reviewed.   Constitutional:       General: She is not in acute distress.     Appearance: She is well-developed. She is obese.   HENT:      Head: Normocephalic and atraumatic.   Eyes:      General: No scleral icterus.     Extraocular Movements: Extraocular movements intact.      Conjunctiva/sclera: Conjunctivae normal.   Neck:      Thyroid: No thyroid mass, thyromegaly or thyroid  tenderness.      Trachea: Trachea normal.   Cardiovascular:      Rate and Rhythm: Normal rate and regular rhythm.      Pulses: Normal pulses.      Heart sounds: No murmur heard.  Pulmonary:      Effort: Pulmonary effort is normal. No respiratory distress.      Breath sounds: Normal breath sounds. No wheezing, rhonchi or rales.   Musculoskeletal:         General: Normal range of motion.      Cervical back: Normal range of motion and neck supple.      Right lower leg: No edema.      Left lower leg: No edema.   Lymphadenopathy:      Cervical: No cervical adenopathy.   Skin:     General: Skin is warm and dry.   Neurological:      Mental Status: She is alert and oriented to person, place, and time.   Psychiatric:         Mood and Affect: Mood and affect normal.         Behavior: Behavior normal.         Thought Content: Thought content normal.         Judgment: Judgment normal.         Result Review :     The following data was reviewed by: SAULO Scherer on 06/13/2022:    CMP    CMP 1/31/22   Glucose 95   BUN 17   Creatinine 1.10 (A)   eGFR Non African Am 51 (A)   Sodium 139   Potassium 4.4   Chloride 101   Calcium 10.2   Albumin 4.60   Total Bilirubin 0.5   Alkaline Phosphatase 83   AST (SGOT) 15   ALT (SGPT) 21   (A) Abnormal value       Comments are available for some flowsheets but are not being displayed.           CBC    CBC 1/31/22   WBC 5.58   RBC 5.15   Hemoglobin 15.7   Hematocrit 45.9   MCV 89.1   MCH 30.5   MCHC 34.2   RDW 12.7   Platelets 265           Lipid Panel    Lipid Panel 1/31/22   Total Cholesterol 201 (A)   Triglycerides 134   HDL Cholesterol 48   VLDL Cholesterol 24   LDL Cholesterol  129 (A)   LDL/HDL Ratio 2.63   (A) Abnormal value            TSH    TSH 1/31/22   TSH 5.490 (A)   (A) Abnormal value                Microalbumin    Microalbumin 1/31/22   Microalbumin, Urine 6.4           Pain Management Panel     Pain Management Panel Latest Ref Rng & Units 1/31/2022 11/24/2020     CREATININE UR mg/dL 112.6 -    AMPHETAMINES SCREEN, URINE NA - NEGATIVE    BARBITURATES SCREEN Negative Negative NEGATIVE    BENZODIAZEPINE SCREEN, URINE Negative Negative NEGATIVE    COCAINE SCREEN, URINE Negative Negative NEGATIVE    METHADONE SCREEN, URINE Negative Negative NEGATIVE                    Assessment and Plan      Diagnoses and all orders for this visit:    1. Primary osteoarthritis involving multiple joints (Primary)  -     traMADol (ULTRAM) 50 MG tablet; Take 1 tablet by mouth 3 (Three) Times a Day As Needed for Moderate Pain  or Severe Pain .  Dispense: 90 tablet; Refill: 2    2. High risk medications (not anticoagulants) long-term use  -     traMADol (ULTRAM) 50 MG tablet; Take 1 tablet by mouth 3 (Three) Times a Day As Needed for Moderate Pain  or Severe Pain .  Dispense: 90 tablet; Refill: 2    3. Abnormal finding on thyroid function test  -     TSH+Free T4            Follow Up     Return in about 3 months (around 9/13/2022) for medication refills and fasting labs.    Patient was given instructions and counseling regarding her condition or for health maintenance advice. Please see specific information pulled into the AVS if appropriate.

## 2022-08-23 DIAGNOSIS — E78.2 MIXED HYPERLIPIDEMIA: ICD-10-CM

## 2022-08-23 DIAGNOSIS — I10 BENIGN ESSENTIAL HYPERTENSION: ICD-10-CM

## 2022-08-23 RX ORDER — EZETIMIBE 10 MG/1
TABLET ORAL
Qty: 30 TABLET | Refills: 0 | Status: SHIPPED | OUTPATIENT
Start: 2022-08-23 | End: 2022-09-07 | Stop reason: SDUPTHER

## 2022-08-23 RX ORDER — LISINOPRIL 20 MG/1
TABLET ORAL
Qty: 30 TABLET | Refills: 0 | Status: SHIPPED | OUTPATIENT
Start: 2022-08-23 | End: 2022-09-07 | Stop reason: SDUPTHER

## 2022-09-07 ENCOUNTER — OFFICE VISIT (OUTPATIENT)
Dept: FAMILY MEDICINE CLINIC | Facility: CLINIC | Age: 62
End: 2022-09-07

## 2022-09-07 VITALS
TEMPERATURE: 96.8 F | WEIGHT: 237 LBS | HEART RATE: 87 BPM | OXYGEN SATURATION: 97 % | BODY MASS INDEX: 39.44 KG/M2 | SYSTOLIC BLOOD PRESSURE: 128 MMHG | DIASTOLIC BLOOD PRESSURE: 84 MMHG

## 2022-09-07 DIAGNOSIS — Z79.899 HIGH RISK MEDICATIONS (NOT ANTICOAGULANTS) LONG-TERM USE: ICD-10-CM

## 2022-09-07 DIAGNOSIS — E78.2 MIXED HYPERLIPIDEMIA: ICD-10-CM

## 2022-09-07 DIAGNOSIS — M15.9 PRIMARY OSTEOARTHRITIS INVOLVING MULTIPLE JOINTS: ICD-10-CM

## 2022-09-07 DIAGNOSIS — I10 BENIGN ESSENTIAL HYPERTENSION: Primary | ICD-10-CM

## 2022-09-07 LAB
ALBUMIN SERPL-MCNC: 4 G/DL (ref 3.5–5.2)
ALBUMIN/GLOB SERPL: 1.4 G/DL
ALP SERPL-CCNC: 65 U/L (ref 39–117)
ALT SERPL W P-5'-P-CCNC: 15 U/L (ref 1–33)
ANION GAP SERPL CALCULATED.3IONS-SCNC: 8.5 MMOL/L (ref 5–15)
AST SERPL-CCNC: 10 U/L (ref 1–32)
BASOPHILS # BLD AUTO: 0.04 10*3/MM3 (ref 0–0.2)
BASOPHILS NFR BLD AUTO: 0.6 % (ref 0–1.5)
BILIRUB SERPL-MCNC: 0.3 MG/DL (ref 0–1.2)
BUN SERPL-MCNC: 17 MG/DL (ref 8–23)
BUN/CREAT SERPL: 17 (ref 7–25)
CALCIUM SPEC-SCNC: 10.1 MG/DL (ref 8.6–10.5)
CHLORIDE SERPL-SCNC: 104 MMOL/L (ref 98–107)
CHOLEST SERPL-MCNC: 175 MG/DL (ref 0–200)
CO2 SERPL-SCNC: 25.5 MMOL/L (ref 22–29)
CREAT SERPL-MCNC: 1 MG/DL (ref 0.57–1)
DEPRECATED RDW RBC AUTO: 42.7 FL (ref 37–54)
EGFRCR SERPLBLD CKD-EPI 2021: 64.2 ML/MIN/1.73
EOSINOPHIL # BLD AUTO: 0.14 10*3/MM3 (ref 0–0.4)
EOSINOPHIL NFR BLD AUTO: 2 % (ref 0.3–6.2)
ERYTHROCYTE [DISTWIDTH] IN BLOOD BY AUTOMATED COUNT: 12.9 % (ref 12.3–15.4)
GLOBULIN UR ELPH-MCNC: 2.9 GM/DL
GLUCOSE SERPL-MCNC: 87 MG/DL (ref 65–99)
HCT VFR BLD AUTO: 39.6 % (ref 34–46.6)
HDLC SERPL-MCNC: 65 MG/DL (ref 40–60)
HGB BLD-MCNC: 13.3 G/DL (ref 12–15.9)
IMM GRANULOCYTES # BLD AUTO: 0.02 10*3/MM3 (ref 0–0.05)
IMM GRANULOCYTES NFR BLD AUTO: 0.3 % (ref 0–0.5)
LDLC SERPL CALC-MCNC: 97 MG/DL (ref 0–100)
LDLC/HDLC SERPL: 1.48 {RATIO}
LYMPHOCYTES # BLD AUTO: 2.43 10*3/MM3 (ref 0.7–3.1)
LYMPHOCYTES NFR BLD AUTO: 35.2 % (ref 19.6–45.3)
MCH RBC QN AUTO: 30.6 PG (ref 26.6–33)
MCHC RBC AUTO-ENTMCNC: 33.6 G/DL (ref 31.5–35.7)
MCV RBC AUTO: 91 FL (ref 79–97)
MONOCYTES # BLD AUTO: 0.44 10*3/MM3 (ref 0.1–0.9)
MONOCYTES NFR BLD AUTO: 6.4 % (ref 5–12)
NEUTROPHILS NFR BLD AUTO: 3.84 10*3/MM3 (ref 1.7–7)
NEUTROPHILS NFR BLD AUTO: 55.5 % (ref 42.7–76)
NRBC BLD AUTO-RTO: 0 /100 WBC (ref 0–0.2)
PLATELET # BLD AUTO: 264 10*3/MM3 (ref 140–450)
PMV BLD AUTO: 9.9 FL (ref 6–12)
POTASSIUM SERPL-SCNC: 5.6 MMOL/L (ref 3.5–5.2)
PROT SERPL-MCNC: 6.9 G/DL (ref 6–8.5)
RBC # BLD AUTO: 4.35 10*6/MM3 (ref 3.77–5.28)
SODIUM SERPL-SCNC: 138 MMOL/L (ref 136–145)
T4 FREE SERPL-MCNC: 1.27 NG/DL (ref 0.93–1.7)
TRIGL SERPL-MCNC: 69 MG/DL (ref 0–150)
TSH SERPL DL<=0.05 MIU/L-ACNC: 2.82 UIU/ML (ref 0.27–4.2)
VLDLC SERPL-MCNC: 13 MG/DL (ref 5–40)
WBC NRBC COR # BLD: 6.91 10*3/MM3 (ref 3.4–10.8)

## 2022-09-07 PROCEDURE — 80061 LIPID PANEL: CPT | Performed by: NURSE PRACTITIONER

## 2022-09-07 PROCEDURE — 80053 COMPREHEN METABOLIC PANEL: CPT | Performed by: NURSE PRACTITIONER

## 2022-09-07 PROCEDURE — 84439 ASSAY OF FREE THYROXINE: CPT | Performed by: NURSE PRACTITIONER

## 2022-09-07 PROCEDURE — 84443 ASSAY THYROID STIM HORMONE: CPT | Performed by: NURSE PRACTITIONER

## 2022-09-07 PROCEDURE — 99214 OFFICE O/P EST MOD 30 MIN: CPT | Performed by: NURSE PRACTITIONER

## 2022-09-07 PROCEDURE — 85025 COMPLETE CBC W/AUTO DIFF WBC: CPT | Performed by: NURSE PRACTITIONER

## 2022-09-07 RX ORDER — LISINOPRIL 20 MG/1
20 TABLET ORAL DAILY
Qty: 90 TABLET | Refills: 1 | Status: SHIPPED | OUTPATIENT
Start: 2022-09-07 | End: 2023-03-08 | Stop reason: SDUPTHER

## 2022-09-07 RX ORDER — EZETIMIBE 10 MG/1
10 TABLET ORAL DAILY
Qty: 90 TABLET | Refills: 1 | Status: SHIPPED | OUTPATIENT
Start: 2022-09-07 | End: 2023-03-29

## 2022-09-07 NOTE — PROGRESS NOTES
Chief Complaint  Osteoarthritis (Refill tramadol ), Hypertension (refills), and Hyperlipidemia (Refills and labs )    Subjective            Marija Burgos presents to [facility]  History of Present Illness     Patient presents to the office today to follow-up on chronic health conditions and for medication refills.    Essential hypertension with dyslipidemia, she is currently prescribed lisinopril 20 mg daily and Zetia 10 mg daily.  Blood pressure is normotensive on exam today, 124/84.  She is intolerant to statins, thus the use of Zetia.  She currently denies any chest pain, palpitations, headaches, dizziness, or shortness of breath.  She denies any lower extremity edema.    She takes Nexium over-the-counter for treatment of reflux symptoms. Symptoms are stable - no c/o dysphagia, nausea, vomiting, or abdominal pain. She is also taking Colace over-the-counter for constipation symptoms.  She had an EGD in November 2020 with Dr. Alfaro.  EGD showed a large hiatal hernia, as well as benign intrinsic stenosis at the pylorus.  C'scope per Dr. Villarreal in 10/2020 with 3 year recall (accessed via Care Everywhere).    She is prescribed tramadol 50 mg 3 times daily for treatment of osteoarthritis of multiple joints.  Her main complaints of joint pain involve the hips, shoulders, and bilateral upper extremities.  She has been taking a 50 mg 3 times daily for several years.  She cannot take NSAIDs due to medical conditions, including stage III chronic kidney disease.  Tylenol alone is not efficacious in treating her pain.  She currently denies any adverse side effects.  She denies any signs or symptoms of abuse or addiction. 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 She doesn't really take breaks or lunches; it is just her most of the time. She states pain is controlled with what she is  doing now.     PEG: A Three-Item Scale Assessing Pain Intensity and Interference  0 being no pain 10 pain as bad as you can imagine  1. What number best describes your pain on average in the past week? 5  2.  What number best describes how, during the past week, pain has interfered with your enjoyment of life? 2  3.  What number best describes how, during the past week, pain has interfered with your general activity? 2      Past Medical History:   Diagnosis Date   • Benign essential hypertension    • GERD (gastroesophageal reflux disease)    • High risk medications (not anticoagulants) long-term use 2020   • HLD (hyperlipidemia)    • Osteoarthritis 2020   • Stage 3 chronic kidney disease (HCC) 2020   • Statin not tolerated 2020       Allergies   Allergen Reactions   • Atorvastatin Myalgia   • Neosporin [Bacitracin-Polymyxin B] Rash        Past Surgical History:   Procedure Laterality Date   • CHOLECYSTECTOMY  2020        Social History     Tobacco Use   • Smoking status: Former Smoker     Packs/day: 1.00     Years: 10.00     Pack years: 10.00     Types: Cigarettes   • Smokeless tobacco: Never Used   Vaping Use   • Vaping Use: Never used   Substance Use Topics   • Alcohol use: Not Currently     Comment: Former   • Drug use: Never       Family History   Problem Relation Age of Onset   • Arthritis Mother         Health Maintenance Due   Topic Date Due   • ANNUAL PHYSICAL  Never done   • TDAP/TD VACCINES (1 - Tdap) Never done   • ZOSTER VACCINE (1 of 2) Never done   • PAP SMEAR  Never done   • COVID-19 Vaccine (3 - Booster for Moderna series) 11/10/2021        Current Outpatient Medications on File Prior to Visit   Medication Sig   • docusate sodium (COLACE) 100 MG capsule Take 100 mg by mouth 2 (Two) Times a Day.   • esomeprazole (nexIUM) 20 MG capsule Take 20 mg by mouth Daily.   • traMADol (ULTRAM) 50 MG tablet Take 1 tablet by mouth 3 (Three) Times a Day As Needed for Moderate Pain  or Severe Pain .   •  [DISCONTINUED] ezetimibe (ZETIA) 10 MG tablet TAKE ONE TABLET BY MOUTH DAILY   • [DISCONTINUED] lisinopril (PRINIVIL,ZESTRIL) 20 MG tablet TAKE ONE TABLET BY MOUTH DAILY   • [DISCONTINUED] Omega-3 Fatty Acids (fish oil) 1000 MG capsule capsule Take  by mouth Daily With Breakfast.     No current facility-administered medications on file prior to visit.       Immunization History   Administered Date(s) Administered   • COVID-19 (MODERNA) 1st, 2nd, 3rd Dose Only 05/13/2021, 06/10/2021   • Influenza Quad Vaccine (Inpatient) 09/05/2017       Review of Systems     Objective     /84   Pulse 87   Temp 96.8 °F (36 °C)   Wt 108 kg (237 lb)   SpO2 97%   BMI 39.44 kg/m²       Physical Exam  Vitals reviewed.   Constitutional:       General: She is not in acute distress.     Appearance: She is well-developed. She is obese.   HENT:      Head: Normocephalic and atraumatic.   Eyes:      General: No scleral icterus.     Extraocular Movements: Extraocular movements intact.      Conjunctiva/sclera: Conjunctivae normal.   Neck:      Thyroid: No thyroid mass, thyromegaly or thyroid tenderness.      Vascular: No carotid bruit.      Trachea: Trachea normal.   Cardiovascular:      Rate and Rhythm: Normal rate and regular rhythm.      Pulses: Normal pulses.      Heart sounds: No murmur heard.  Pulmonary:      Effort: Pulmonary effort is normal. No respiratory distress.      Breath sounds: Normal breath sounds. No wheezing, rhonchi or rales.   Musculoskeletal:         General: Normal range of motion.      Cervical back: Normal range of motion and neck supple.      Right lower leg: No edema.      Left lower leg: No edema.   Lymphadenopathy:      Cervical: No cervical adenopathy.   Skin:     General: Skin is warm and dry.   Neurological:      Mental Status: She is alert and oriented to person, place, and time.   Psychiatric:         Mood and Affect: Mood and affect normal.         Behavior: Behavior normal.         Thought Content:  Thought content normal.         Judgment: Judgment normal.         Result Review :     The following data was reviewed by: SAULO Scherer on 09/07/2022:    CMP    CMP 1/31/22   Glucose 95   BUN 17   Creatinine 1.10 (A)   eGFR Non African Am 51 (A)   Sodium 139   Potassium 4.4   Chloride 101   Calcium 10.2   Albumin 4.60   Total Bilirubin 0.5   Alkaline Phosphatase 83   AST (SGOT) 15   ALT (SGPT) 21   (A) Abnormal value       Comments are available for some flowsheets but are not being displayed.           CBC    CBC 1/31/22   WBC 5.58   RBC 5.15   Hemoglobin 15.7   Hematocrit 45.9   MCV 89.1   MCH 30.5   MCHC 34.2   RDW 12.7   Platelets 265           Lipid Panel    Lipid Panel 1/31/22   Total Cholesterol 201 (A)   Triglycerides 134   HDL Cholesterol 48   VLDL Cholesterol 24   LDL Cholesterol  129 (A)   LDL/HDL Ratio 2.63   (A) Abnormal value            TSH    TSH 1/31/22 6/13/22   TSH 5.490 (A) 3.820   (A) Abnormal value              Microalbumin    Microalbumin 1/31/22   Microalbumin, Urine 6.4           Last Urine Toxicity     LAST URINE TOXICITY RESULTS Latest Ref Rng & Units 1/31/2022 11/24/2020    CREATININE UR mg/dL 112.6 -    AMPHETAMINES SCREEN, URINE NA - NEGATIVE    BARBITURATES SCREEN Negative Negative NEGATIVE    BENZODIAZEPINE SCREEN, URINE Negative Negative NEGATIVE    COCAINE SCREEN, URINE Negative Negative NEGATIVE    METHADONE SCREEN, URINE Negative Negative NEGATIVE          Data reviewed: Radiologic studies :   Mammo Screening Bilateral With CAD (01/31/2022 09:03)           Assessment and Plan      Diagnoses and all orders for this visit:    1. Benign essential hypertension (Primary)  -     CBC Auto Differential  -     Comprehensive Metabolic Panel  -     Lipid Panel  -     TSH+Free T4  -     lisinopril (PRINIVIL,ZESTRIL) 20 MG tablet; Take 1 tablet by mouth Daily.  Dispense: 90 tablet; Refill: 1    2. Mixed hyperlipidemia  -     Comprehensive Metabolic Panel  -     Lipid Panel  -      ezetimibe (ZETIA) 10 MG tablet; Take 1 tablet by mouth Daily.  Dispense: 90 tablet; Refill: 1    3. Primary osteoarthritis involving multiple joints  Comments:  Will call Monday for a refill of the Tramadol - too soon to refill today    4. High risk medications (not anticoagulants) long-term use            Follow Up     Return in about 3 months (around 12/7/2022) for Next scheduled follow up.     She will get her fasting labs today.  Refills to the pharmacy.  Advised patient that we can refill her tramadol as of Monday of next week.  She will call when she has 2 days remaining.    Patient was given instructions and counseling regarding her condition or for health maintenance advice. Please see specific information pulled into the AVS if appropriate.

## 2022-09-07 NOTE — PROGRESS NOTES
Venipuncture Blood Specimen Collection  Venipuncture performed in left arm by Maddie Ervin with good hemostasis. Patient tolerated the procedure well without complications.   09/07/22   Maddie Ervin

## 2022-09-08 DIAGNOSIS — E87.5 HYPERKALEMIA: Primary | ICD-10-CM

## 2022-09-13 DIAGNOSIS — M15.9 PRIMARY OSTEOARTHRITIS INVOLVING MULTIPLE JOINTS: ICD-10-CM

## 2022-09-13 DIAGNOSIS — Z79.899 HIGH RISK MEDICATIONS (NOT ANTICOAGULANTS) LONG-TERM USE: ICD-10-CM

## 2022-09-13 RX ORDER — TRAMADOL HYDROCHLORIDE 50 MG/1
50 TABLET ORAL 3 TIMES DAILY PRN
Qty: 90 TABLET | Refills: 2 | Status: SHIPPED | OUTPATIENT
Start: 2022-09-13 | End: 2022-12-12 | Stop reason: SDUPTHER

## 2022-09-13 NOTE — TELEPHONE ENCOUNTER
Caller: Marija Burgos    Relationship: Self    Best call back number: [phone]    Requested Prescriptions:   Requested Prescriptions     Pending Prescriptions Disp Refills   • traMADol (ULTRAM) 50 MG tablet 90 tablet 2     Sig: Take 1 tablet by mouth 3 (Three) Times a Day As Needed for Moderate Pain or Severe Pain.        Pharmacy where request should be sent: [facility] - [phone] [facility] [phone] FX         Does the patient have less than a 3 day supply:  [x] Yes  [] No    Soheila Mercado Rep   09/13/22 12:47 EDT

## 2022-10-05 ENCOUNTER — TELEPHONE (OUTPATIENT)
Dept: FAMILY MEDICINE CLINIC | Facility: CLINIC | Age: 62
End: 2022-10-05

## 2022-10-06 ENCOUNTER — CLINICAL SUPPORT (OUTPATIENT)
Dept: FAMILY MEDICINE CLINIC | Facility: CLINIC | Age: 62
End: 2022-10-06

## 2022-10-06 DIAGNOSIS — E87.5 HYPERKALEMIA: ICD-10-CM

## 2022-10-06 LAB — POTASSIUM SERPL-SCNC: 5 MMOL/L (ref 3.5–5.2)

## 2022-10-06 PROCEDURE — 36415 COLL VENOUS BLD VENIPUNCTURE: CPT | Performed by: NURSE PRACTITIONER

## 2022-10-06 PROCEDURE — 84132 ASSAY OF SERUM POTASSIUM: CPT | Performed by: NURSE PRACTITIONER

## 2022-10-06 NOTE — PROGRESS NOTES
Venipuncture Blood Specimen Collection  Venipuncture performed in left arm by Maddie Ervin with good hemostasis. Patient tolerated the procedure well without complications.   10/06/22   Maddie Ervin

## 2022-12-12 ENCOUNTER — OFFICE VISIT (OUTPATIENT)
Dept: FAMILY MEDICINE CLINIC | Facility: CLINIC | Age: 62
End: 2022-12-12

## 2022-12-12 VITALS
WEIGHT: 248 LBS | HEART RATE: 80 BPM | BODY MASS INDEX: 41.27 KG/M2 | OXYGEN SATURATION: 97 % | TEMPERATURE: 97 F | SYSTOLIC BLOOD PRESSURE: 135 MMHG | DIASTOLIC BLOOD PRESSURE: 86 MMHG

## 2022-12-12 DIAGNOSIS — Z79.899 HIGH RISK MEDICATIONS (NOT ANTICOAGULANTS) LONG-TERM USE: ICD-10-CM

## 2022-12-12 DIAGNOSIS — Z28.21 INFLUENZA VACCINATION DECLINED: ICD-10-CM

## 2022-12-12 DIAGNOSIS — M15.9 PRIMARY OSTEOARTHRITIS INVOLVING MULTIPLE JOINTS: Primary | ICD-10-CM

## 2022-12-12 LAB
ANION GAP SERPL CALCULATED.3IONS-SCNC: 8 MMOL/L (ref 5–15)
BUN SERPL-MCNC: 15 MG/DL (ref 8–23)
BUN/CREAT SERPL: 13.4 (ref 7–25)
CALCIUM SPEC-SCNC: 10.4 MG/DL (ref 8.6–10.5)
CHLORIDE SERPL-SCNC: 107 MMOL/L (ref 98–107)
CO2 SERPL-SCNC: 26 MMOL/L (ref 22–29)
CREAT SERPL-MCNC: 1.12 MG/DL (ref 0.57–1)
EGFRCR SERPLBLD CKD-EPI 2021: 56.1 ML/MIN/1.73
GLUCOSE SERPL-MCNC: 89 MG/DL (ref 65–99)
POTASSIUM SERPL-SCNC: 5.4 MMOL/L (ref 3.5–5.2)
SODIUM SERPL-SCNC: 141 MMOL/L (ref 136–145)

## 2022-12-12 PROCEDURE — 80048 BASIC METABOLIC PNL TOTAL CA: CPT | Performed by: NURSE PRACTITIONER

## 2022-12-12 PROCEDURE — 99214 OFFICE O/P EST MOD 30 MIN: CPT | Performed by: NURSE PRACTITIONER

## 2022-12-12 PROCEDURE — 36415 COLL VENOUS BLD VENIPUNCTURE: CPT | Performed by: NURSE PRACTITIONER

## 2022-12-12 RX ORDER — TRAMADOL HYDROCHLORIDE 50 MG/1
50 TABLET ORAL 3 TIMES DAILY PRN
Qty: 90 TABLET | Refills: 2 | Status: SHIPPED | OUTPATIENT
Start: 2022-12-12 | End: 2023-03-10 | Stop reason: SDUPTHER

## 2022-12-12 NOTE — PROGRESS NOTES
Venipuncture Blood Specimen Collection  Venipuncture performed in left arm by Maddie Ervin with good hemostasis. Patient tolerated the procedure well without complications.   12/12/22   Maddie Ervin

## 2022-12-12 NOTE — PROGRESS NOTES
Chief Complaint  Osteoarthritis (Refill ultram )    Subjective            Marija Burgos presents to [facility]  History of Present Illness     She is prescribed tramadol 50 mg 3 times daily for treatment of osteoarthritis of multiple joints.  Her main complaints of joint pain involve the hips, shoulders, and bilateral upper extremities.  She has been taking a 50 mg 3 times daily for several years.  She cannot take NSAIDs due to medical conditions, including stage III chronic kidney disease.  Tylenol alone is not efficacious in treating her pain.  She currently denies any adverse side effects.  She denies any signs or symptoms of abuse or addiction.    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 It has been hard on her physically. She has been taking Excedrin migraine for the caffeine component, but she is concerned because it contains ASA. She has not tried taking Tylenol arthritis. Her pain has been exacerbated by taking this job.      PEG: A Three-Item Scale Assessing Pain Intensity and Interference  0 being no pain 10 pain as bad as you can imagine  1. What number best describes your pain on average in the past week? 7  2.  What number best describes how, during the past week, pain has interfered with your enjoyment of life? 4   3.  What number best describes how, during the past week, pain has interfered with your general activity? 4       Past Medical History:   Diagnosis Date   • Benign essential hypertension    • GERD (gastroesophageal reflux disease)    • High risk medications (not anticoagulants) long-term use 2020   • HLD (hyperlipidemia)    • Osteoarthritis 2020   • Stage 3 chronic kidney disease (HCC) 2020   • Statin not tolerated 2020       Allergies    Allergen Reactions   • Atorvastatin Myalgia   • Neosporin [Bacitracin-Polymyxin B] Rash        Past Surgical History:   Procedure Laterality Date   • CHOLECYSTECTOMY  2020        Social History     Tobacco Use   • Smoking status: Former     Packs/day: 1.00     Years: 10.00     Pack years: 10.00     Types: Cigarettes   • Smokeless tobacco: Never   Vaping Use   • Vaping Use: Never used   Substance Use Topics   • Alcohol use: Not Currently     Comment: Former   • Drug use: Never       Family History   Problem Relation Age of Onset   • Arthritis Mother         Health Maintenance Due   Topic Date Due   • TDAP/TD VACCINES (1 - Tdap) Never done   • ZOSTER VACCINE (1 of 2) Never done   • COVID-19 Vaccine (3 - Booster for Moderna series) 08/05/2021   • PAP SMEAR  Never done        Current Outpatient Medications on File Prior to Visit   Medication Sig   • docusate sodium (COLACE) 100 MG capsule Take 100 mg by mouth 2 (Two) Times a Day.   • esomeprazole (nexIUM) 20 MG capsule Take 20 mg by mouth Daily.   • ezetimibe (ZETIA) 10 MG tablet Take 1 tablet by mouth Daily.   • lisinopril (PRINIVIL,ZESTRIL) 20 MG tablet Take 1 tablet by mouth Daily.   • [DISCONTINUED] traMADol (ULTRAM) 50 MG tablet Take 1 tablet by mouth 3 (Three) Times a Day As Needed for Moderate Pain or Severe Pain.     No current facility-administered medications on file prior to visit.       Immunization History   Administered Date(s) Administered   • COVID-19 (MODERNA) 1st, 2nd, 3rd Dose Only 05/13/2021, 06/10/2021   • Influenza Quad Vaccine (Inpatient) 09/05/2017       Review of Systems     Objective     /86   Pulse 80   Temp 97 °F (36.1 °C)   Wt 112 kg (248 lb)   SpO2 97%   BMI 41.27 kg/m²       Physical Exam  Vitals reviewed.   Constitutional:       General: She is not in acute distress.     Appearance: She is well-developed. She is obese.   HENT:      Head: Normocephalic and atraumatic.   Eyes:      General: No scleral icterus.     Extraocular  Movements: Extraocular movements intact.      Conjunctiva/sclera: Conjunctivae normal.   Neck:      Trachea: Trachea normal.   Cardiovascular:      Rate and Rhythm: Normal rate and regular rhythm.      Pulses: Normal pulses.      Heart sounds: No murmur heard.  Pulmonary:      Effort: Pulmonary effort is normal. No respiratory distress.      Breath sounds: Normal breath sounds. No wheezing, rhonchi or rales.   Musculoskeletal:         General: Normal range of motion.      Cervical back: Normal range of motion and neck supple.   Lymphadenopathy:      Cervical: No cervical adenopathy.   Skin:     General: Skin is warm and dry.   Neurological:      Mental Status: She is alert and oriented to person, place, and time.   Psychiatric:         Mood and Affect: Mood and affect normal.         Behavior: Behavior normal.         Thought Content: Thought content normal.         Judgment: Judgment normal.         Result Review :     The following data was reviewed by: SAULO Scherer on 12/12/2022:    CMP    CMP 1/31/22 9/7/22 10/6/22   Glucose 95 87    BUN 17 17    Creatinine 1.10 (A) 1.00    eGFR Non African Am 51 (A)     Sodium 139 138    Potassium 4.4 5.6 (A) 5.0   Chloride 101 104    Calcium 10.2 10.1    Albumin 4.60 4.00    Total Bilirubin 0.5 0.3    Alkaline Phosphatase 83 65    AST (SGOT) 15 10    ALT (SGPT) 21 15    (A) Abnormal value       Comments are available for some flowsheets but are not being displayed.           Last Urine Toxicity     LAST URINE TOXICITY RESULTS Latest Ref Rng & Units 1/31/2022 11/24/2020    CREATININE UR mg/dL 112.6 -    AMPHETAMINES SCREEN, URINE NA - NEGATIVE    BARBITURATES SCREEN Negative Negative NEGATIVE    BENZODIAZEPINE SCREEN, URINE Negative Negative NEGATIVE    COCAINE SCREEN, URINE Negative Negative NEGATIVE    METHADONE SCREEN, URINE Negative Negative NEGATIVE                    Assessment and Plan      Diagnoses and all orders for this visit:    1. Primary  osteoarthritis involving multiple joints (Primary)  -     Basic metabolic panel  -     traMADol (ULTRAM) 50 MG tablet; Take 1 tablet by mouth 3 (Three) Times a Day As Needed for Moderate Pain or Severe Pain.  Dispense: 90 tablet; Refill: 2    2. High risk medications (not anticoagulants) long-term use  -     traMADol (ULTRAM) 50 MG tablet; Take 1 tablet by mouth 3 (Three) Times a Day As Needed for Moderate Pain or Severe Pain.  Dispense: 90 tablet; Refill: 2    3. Influenza vaccination declined            Follow Up     Return in about 3 months (around 3/12/2023) for medication refills and fasting labs.     Advised patient to avoid use of NSAIDs, including aspirin.  She will attempt a trial of Tylenol arthritis as needed in addition to her Ultram.  We will reassess in 3 months, or sooner if worsening symptoms.  Her job now is more physically demanding than when she worked at [employer] previously.    Patient was given instructions and counseling regarding her condition or for health maintenance advice. Please see specific information pulled into the AVS if appropriate.

## 2022-12-13 DIAGNOSIS — E87.5 HYPERKALEMIA: Primary | ICD-10-CM

## 2023-03-08 ENCOUNTER — OFFICE VISIT (OUTPATIENT)
Dept: FAMILY MEDICINE CLINIC | Facility: CLINIC | Age: 63
End: 2023-03-08
Payer: COMMERCIAL

## 2023-03-08 VITALS
WEIGHT: 255 LBS | BODY MASS INDEX: 42.43 KG/M2 | DIASTOLIC BLOOD PRESSURE: 80 MMHG | HEART RATE: 111 BPM | SYSTOLIC BLOOD PRESSURE: 138 MMHG | OXYGEN SATURATION: 99 % | TEMPERATURE: 97.1 F

## 2023-03-08 DIAGNOSIS — Z12.11 COLON CANCER SCREENING: ICD-10-CM

## 2023-03-08 DIAGNOSIS — I10 BENIGN ESSENTIAL HYPERTENSION: Primary | ICD-10-CM

## 2023-03-08 DIAGNOSIS — M15.9 PRIMARY OSTEOARTHRITIS INVOLVING MULTIPLE JOINTS: ICD-10-CM

## 2023-03-08 DIAGNOSIS — E78.2 MIXED HYPERLIPIDEMIA: ICD-10-CM

## 2023-03-08 DIAGNOSIS — Z12.31 ENCOUNTER FOR SCREENING MAMMOGRAM FOR BREAST CANCER: ICD-10-CM

## 2023-03-08 DIAGNOSIS — Z79.899 HIGH RISK MEDICATIONS (NOT ANTICOAGULANTS) LONG-TERM USE: ICD-10-CM

## 2023-03-08 LAB
ALBUMIN SERPL-MCNC: 4.4 G/DL (ref 3.5–5.2)
ALBUMIN UR-MCNC: <1.2 MG/DL
ALBUMIN/GLOB SERPL: 1.5 G/DL
ALP SERPL-CCNC: 85 U/L (ref 39–117)
ALT SERPL W P-5'-P-CCNC: 36 U/L (ref 1–33)
AMPHET+METHAMPHET UR QL: NEGATIVE
ANION GAP SERPL CALCULATED.3IONS-SCNC: 10 MMOL/L (ref 5–15)
AST SERPL-CCNC: 15 U/L (ref 1–32)
BARBITURATES UR QL SCN: NEGATIVE
BASOPHILS # BLD AUTO: 0.03 10*3/MM3 (ref 0–0.2)
BASOPHILS NFR BLD AUTO: 0.4 % (ref 0–1.5)
BENZODIAZ UR QL SCN: NEGATIVE
BILIRUB SERPL-MCNC: 0.4 MG/DL (ref 0–1.2)
BUN SERPL-MCNC: 19 MG/DL (ref 8–23)
BUN/CREAT SERPL: 17.9 (ref 7–25)
CALCIUM SPEC-SCNC: 9.9 MG/DL (ref 8.6–10.5)
CANNABINOIDS SERPL QL: NEGATIVE
CHLORIDE SERPL-SCNC: 102 MMOL/L (ref 98–107)
CHOLEST SERPL-MCNC: 213 MG/DL (ref 0–200)
CO2 SERPL-SCNC: 28 MMOL/L (ref 22–29)
COCAINE UR QL: NEGATIVE
CREAT SERPL-MCNC: 1.06 MG/DL (ref 0.57–1)
CREAT UR-MCNC: 79.5 MG/DL
DEPRECATED RDW RBC AUTO: 41.5 FL (ref 37–54)
EGFRCR SERPLBLD CKD-EPI 2021: 59.5 ML/MIN/1.73
EOSINOPHIL # BLD AUTO: 0.18 10*3/MM3 (ref 0–0.4)
EOSINOPHIL NFR BLD AUTO: 2.5 % (ref 0.3–6.2)
ERYTHROCYTE [DISTWIDTH] IN BLOOD BY AUTOMATED COUNT: 12.7 % (ref 12.3–15.4)
GLOBULIN UR ELPH-MCNC: 3 GM/DL
GLUCOSE SERPL-MCNC: 84 MG/DL (ref 65–99)
HCT VFR BLD AUTO: 41.8 % (ref 34–46.6)
HDLC SERPL-MCNC: 45 MG/DL (ref 40–60)
HGB BLD-MCNC: 14.5 G/DL (ref 12–15.9)
IMM GRANULOCYTES # BLD AUTO: 0.02 10*3/MM3 (ref 0–0.05)
IMM GRANULOCYTES NFR BLD AUTO: 0.3 % (ref 0–0.5)
LDLC SERPL CALC-MCNC: 140 MG/DL (ref 0–100)
LDLC/HDLC SERPL: 3.05 {RATIO}
LYMPHOCYTES # BLD AUTO: 2.56 10*3/MM3 (ref 0.7–3.1)
LYMPHOCYTES NFR BLD AUTO: 36.2 % (ref 19.6–45.3)
MCH RBC QN AUTO: 30.9 PG (ref 26.6–33)
MCHC RBC AUTO-ENTMCNC: 34.7 G/DL (ref 31.5–35.7)
MCV RBC AUTO: 89.1 FL (ref 79–97)
METHADONE UR QL SCN: NEGATIVE
MICROALBUMIN/CREAT UR: NORMAL MG/G{CREAT}
MONOCYTES # BLD AUTO: 0.49 10*3/MM3 (ref 0.1–0.9)
MONOCYTES NFR BLD AUTO: 6.9 % (ref 5–12)
NEUTROPHILS NFR BLD AUTO: 3.8 10*3/MM3 (ref 1.7–7)
NEUTROPHILS NFR BLD AUTO: 53.7 % (ref 42.7–76)
NRBC BLD AUTO-RTO: 0 /100 WBC (ref 0–0.2)
OPIATES UR QL: NEGATIVE
OXYCODONE UR QL SCN: NEGATIVE
PLATELET # BLD AUTO: 254 10*3/MM3 (ref 140–450)
PMV BLD AUTO: 9.7 FL (ref 6–12)
POTASSIUM SERPL-SCNC: 5.4 MMOL/L (ref 3.5–5.2)
PROT SERPL-MCNC: 7.4 G/DL (ref 6–8.5)
RBC # BLD AUTO: 4.69 10*6/MM3 (ref 3.77–5.28)
SODIUM SERPL-SCNC: 140 MMOL/L (ref 136–145)
T4 FREE SERPL-MCNC: 1.19 NG/DL (ref 0.93–1.7)
TRIGL SERPL-MCNC: 153 MG/DL (ref 0–150)
TSH SERPL DL<=0.05 MIU/L-ACNC: 3.5 UIU/ML (ref 0.27–4.2)
VLDLC SERPL-MCNC: 28 MG/DL (ref 5–40)
WBC NRBC COR # BLD: 7.08 10*3/MM3 (ref 3.4–10.8)

## 2023-03-08 PROCEDURE — 80307 DRUG TEST PRSMV CHEM ANLYZR: CPT | Performed by: NURSE PRACTITIONER

## 2023-03-08 PROCEDURE — 99214 OFFICE O/P EST MOD 30 MIN: CPT | Performed by: NURSE PRACTITIONER

## 2023-03-08 PROCEDURE — 1160F RVW MEDS BY RX/DR IN RCRD: CPT | Performed by: NURSE PRACTITIONER

## 2023-03-08 PROCEDURE — 3075F SYST BP GE 130 - 139MM HG: CPT | Performed by: NURSE PRACTITIONER

## 2023-03-08 PROCEDURE — 80053 COMPREHEN METABOLIC PANEL: CPT | Performed by: NURSE PRACTITIONER

## 2023-03-08 PROCEDURE — 82043 UR ALBUMIN QUANTITATIVE: CPT | Performed by: NURSE PRACTITIONER

## 2023-03-08 PROCEDURE — 3079F DIAST BP 80-89 MM HG: CPT | Performed by: NURSE PRACTITIONER

## 2023-03-08 PROCEDURE — 80061 LIPID PANEL: CPT | Performed by: NURSE PRACTITIONER

## 2023-03-08 PROCEDURE — 1159F MED LIST DOCD IN RCRD: CPT | Performed by: NURSE PRACTITIONER

## 2023-03-08 PROCEDURE — 84443 ASSAY THYROID STIM HORMONE: CPT | Performed by: NURSE PRACTITIONER

## 2023-03-08 PROCEDURE — 84439 ASSAY OF FREE THYROXINE: CPT | Performed by: NURSE PRACTITIONER

## 2023-03-08 PROCEDURE — 82570 ASSAY OF URINE CREATININE: CPT | Performed by: NURSE PRACTITIONER

## 2023-03-08 PROCEDURE — 85025 COMPLETE CBC W/AUTO DIFF WBC: CPT | Performed by: NURSE PRACTITIONER

## 2023-03-08 RX ORDER — LISINOPRIL 20 MG/1
20 TABLET ORAL DAILY
Qty: 90 TABLET | Refills: 1 | Status: SHIPPED | OUTPATIENT
Start: 2023-03-08 | End: 2023-03-13

## 2023-03-08 NOTE — PROGRESS NOTES
"Chief Complaint  Osteoarthritis (Refill tramadol ) and Hyperlipidemia (Discuss zetia )    Subjective            Marija Burgos presents to [facility]  History of Present Illness     Marija presents to the office today to follow up on chronic health conditions and medication refills     She is currently prescribed Zetia - she stopped taking it a week ago d/t concerns for myalgia - she states that she was also working at [employer], but quit that job a week ago - also notes that she gained weight. She stopped the Zetia about the time she quit the job, and she has noticed an improvement in muscle cramping/soreness - mostly in the forearms. She isn't sure if it is r/t the medication, or the job - the job was labor intensive.     Blood pressure is normotensive on exam today - she is still taking lisinopril 20mg once daily for HTN. No c/o chest pain, palpitations, headaches, dizziness, or shortness of breath. No swelling in the LEs.     She continues to take Tramadol 50mg TID for treatment of OA pain - multiple joints. It is worse in the hips than anywhere else, but also involving the shoulders and BUEs. She has taken Tramadol for a very long time - due to chronic kidney disease - and denies any adverse side effects. She feels like it isn't work as well as it used to, but states \"I am getting older\" and so long as she doesn't have to be on her feet 8 hours a day with no breaks, she does okay. If she does not take it, she feels it.     PEG: A Three-Item Scale Assessing Pain Intensity and Interference  0 being no pain 10 pain as bad as you can imagine  1. What number best describes your pain on average in the past week? 1 (when working average pain was 6 on 1-10 scale)  2.  What number best describes how, during the past week, pain has interfered with your enjoyment of life? 0  3.  What number best describes how, during the past week, pain has interfered with your general activity? " 2    PHQ-2 Total Score: 0    Past Medical History:   Diagnosis Date   • Benign essential hypertension    • GERD (gastroesophageal reflux disease)    • High risk medications (not anticoagulants) long-term use 2020   • HLD (hyperlipidemia)    • Osteoarthritis 2020   • Stage 3 chronic kidney disease (HCC) 2020   • Statin not tolerated 2020       Allergies   Allergen Reactions   • Atorvastatin Myalgia   • Neosporin [Bacitracin-Polymyxin B] Rash        Past Surgical History:   Procedure Laterality Date   • CHOLECYSTECTOMY  2020        Social History     Tobacco Use   • Smoking status: Former     Packs/day: 1.00     Years: 10.00     Pack years: 10.00     Types: Cigarettes   • Smokeless tobacco: Never   Vaping Use   • Vaping Use: Never used   Substance Use Topics   • Alcohol use: Not Currently     Comment: Former   • Drug use: Never       Family History   Problem Relation Age of Onset   • Arthritis Mother         Health Maintenance Due   Topic Date Due   • TDAP/TD VACCINES (1 - Tdap) Never done   • ZOSTER VACCINE (1 of 2) Never done   • ANNUAL PHYSICAL  Never done   • PAP SMEAR  Never done        Current Outpatient Medications on File Prior to Visit   Medication Sig   • esomeprazole (nexIUM) 20 MG capsule Take 1 capsule by mouth Daily.   • traMADol (ULTRAM) 50 MG tablet Take 1 tablet by mouth 3 (Three) Times a Day As Needed for Moderate Pain or Severe Pain.   • [DISCONTINUED] lisinopril (PRINIVIL,ZESTRIL) 20 MG tablet Take 1 tablet by mouth Daily.   • docusate sodium (COLACE) 100 MG capsule Take 1 capsule by mouth 2 (Two) Times a Day.   • ezetimibe (ZETIA) 10 MG tablet Take 1 tablet by mouth Daily.     No current facility-administered medications on file prior to visit.       Immunization History   Administered Date(s) Administered   • COVID-19 (MODERNA) 1st, 2nd, 3rd Dose Only 05/13/2021, 06/10/2021   • Influenza Quad Vaccine (Inpatient) 09/05/2017       Review of Systems     Objective     /80   Pulse 111   Temp  97.1 °F (36.2 °C)   Wt 116 kg (255 lb)   SpO2 99%   BMI 42.43 kg/m²       Physical Exam  Vitals reviewed.   Constitutional:       General: She is not in acute distress.     Appearance: She is well-developed. She is obese.   HENT:      Head: Normocephalic and atraumatic.   Eyes:      General: No scleral icterus.     Extraocular Movements: Extraocular movements intact.      Conjunctiva/sclera: Conjunctivae normal.   Neck:      Thyroid: No thyroid mass, thyromegaly or thyroid tenderness.      Vascular: No carotid bruit.      Trachea: Trachea normal.   Cardiovascular:      Rate and Rhythm: Normal rate and regular rhythm.      Pulses: Normal pulses.      Heart sounds: No murmur heard.  Pulmonary:      Effort: Pulmonary effort is normal. No respiratory distress.      Breath sounds: Normal breath sounds. No wheezing, rhonchi or rales.   Musculoskeletal:         General: Normal range of motion.      Cervical back: Normal range of motion and neck supple.      Right lower leg: No edema.      Left lower leg: No edema.   Lymphadenopathy:      Cervical: No cervical adenopathy.   Skin:     General: Skin is warm and dry.   Neurological:      Mental Status: She is alert and oriented to person, place, and time.   Psychiatric:         Mood and Affect: Mood and affect normal.         Behavior: Behavior normal.         Thought Content: Thought content normal.         Judgment: Judgment normal.         Result Review :     The following data was reviewed by: SAULO Scherer on 03/08/2023:    CMP    CMP 9/7/22 10/6/22 12/12/22   Glucose 87  89   BUN 17  15   Creatinine 1.00  1.12 (A)   eGFR 64.2  56.1 (A)   Sodium 138  141   Potassium 5.6 (A) 5.0 5.4 (A)   Chloride 104  107   Calcium 10.1  10.4   Total Protein 6.9     Albumin 4.00     Globulin 2.9     Total Bilirubin 0.3     Alkaline Phosphatase 65     AST (SGOT) 10     ALT (SGPT) 15     Albumin/Globulin Ratio 1.4     BUN/Creatinine Ratio 17.0  13.4   Anion Gap 8.5  8.0   (A)  Abnormal value       Comments are available for some flowsheets but are not being displayed.           CBC    CBC 9/7/22   WBC 6.91   RBC 4.35   Hemoglobin 13.3   Hematocrit 39.6   MCV 91.0   MCH 30.6   MCHC 33.6   RDW 12.9   Platelets 264           Lipid Panel    Lipid Panel 9/7/22   Total Cholesterol 175   Triglycerides 69   HDL Cholesterol 65 (A)   VLDL Cholesterol 13   LDL Cholesterol  97   LDL/HDL Ratio 1.48   (A) Abnormal value            TSH    TSH 6/13/22 9/7/22   TSH 3.820 2.820           Last Urine Toxicity     LAST URINE TOXICITY RESULTS Latest Ref Rng & Units 1/31/2022 11/24/2020    CREATININE UR mg/dL 112.6 -    AMPHETAMINES SCREEN, URINE NA - NEGATIVE    BARBITURATES SCREEN Negative Negative NEGATIVE    BENZODIAZEPINE SCREEN, URINE Negative Negative NEGATIVE    COCAINE SCREEN, URINE Negative Negative NEGATIVE    METHADONE SCREEN, URINE Negative Negative NEGATIVE        Data reviewed: Radiologic studies :   Mammo Screening Bilateral With CAD (01/31/2022 09:03)           Assessment and Plan      Diagnoses and all orders for this visit:    1. Benign essential hypertension (Primary)  -     lisinopril (PRINIVIL,ZESTRIL) 20 MG tablet; Take 1 tablet by mouth Daily.  Dispense: 90 tablet; Refill: 1  -     CBC Auto Differential  -     Comprehensive Metabolic Panel  -     Lipid Panel  -     TSH+Free T4  -     Microalbumin / Creatinine Urine Ratio - Urine, Clean Catch    2. Mixed hyperlipidemia  Comments:  Statin intolerance - ? intolerance r/t Zetia - will hold one more week, then resume use, and if muscle aches return she will discontinue and call the office.  Orders:  -     Comprehensive Metabolic Panel  -     Lipid Panel    3. Primary osteoarthritis involving multiple joints  Comments:  Will send refill for Tramadol at current dosing on 03/10/2023 - too soon to send today     4. High risk medications (not anticoagulants) long-term use  -     Urine Drug Screen - Urine, Clean Catch    5. Encounter for screening  mammogram for breast cancer  -     Mammo Screening Digital Tomosynthesis Bilateral With CAD; Future    6. Colon cancer screening  -     Cologuard - Stool, Per Rectum; Future            Follow Up     Return in about 3 months (around 6/8/2023) for Next scheduled follow up (pain medication follow up).    Patient was given instructions and counseling regarding her condition or for health maintenance advice. Please see specific information pulled into the AVS if appropriate.

## 2023-03-08 NOTE — PROGRESS NOTES
..  Venipuncture Blood Specimen Collection  Venipuncture performed in  left arm by Melida Garcia with good hemostasis. Patient tolerated the procedure well without complications.   03/08/23   Melida Garcia

## 2023-03-09 ENCOUNTER — PATIENT MESSAGE (OUTPATIENT)
Dept: FAMILY MEDICINE CLINIC | Facility: CLINIC | Age: 63
End: 2023-03-09
Payer: COMMERCIAL

## 2023-03-09 DIAGNOSIS — I10 BENIGN ESSENTIAL HYPERTENSION: Primary | ICD-10-CM

## 2023-03-10 DIAGNOSIS — Z79.899 HIGH RISK MEDICATIONS (NOT ANTICOAGULANTS) LONG-TERM USE: ICD-10-CM

## 2023-03-10 DIAGNOSIS — M15.9 PRIMARY OSTEOARTHRITIS INVOLVING MULTIPLE JOINTS: ICD-10-CM

## 2023-03-10 RX ORDER — TRAMADOL HYDROCHLORIDE 50 MG/1
50 TABLET ORAL 3 TIMES DAILY PRN
Qty: 90 TABLET | Refills: 2 | Status: SHIPPED | OUTPATIENT
Start: 2023-03-10

## 2023-03-13 RX ORDER — AMLODIPINE BESYLATE 5 MG/1
5 TABLET ORAL DAILY
Qty: 90 TABLET | Refills: 0 | Status: SHIPPED | OUTPATIENT
Start: 2023-03-13

## 2023-03-13 NOTE — TELEPHONE ENCOUNTER
From: Bettina Zuniga MA  To: Marija Burgos  Sent: 3/9/2023 1:05 PM EST  Subject: Results    Marija,    Below is a message from Michelle in regards to recent labs, I have also tried to reach you by telephone and left a message, just please reply to either this message or you can call us back at [phone]:    CMP shows stable stage III chronic kidney disease; however, her potassium is still slightly elevated. I am concerned that it is her lisinopril is contributing. Would she be agreeable to stopping her lisinopril and trying something different such as amlodipine/Norvasc? This would require her to come back into the office in 1 to 2 weeks for blood pressure check, as well as to recheck her labs.     Lipid profile is abnormal, but that is to be expected since she had to stop taking the cholesterol medication.     All other labs are normal.     Thanks,  Bettina

## 2023-03-29 DIAGNOSIS — E78.2 MIXED HYPERLIPIDEMIA: ICD-10-CM

## 2023-03-29 RX ORDER — EZETIMIBE 10 MG/1
TABLET ORAL
Qty: 90 TABLET | Refills: 1 | Status: SHIPPED | OUTPATIENT
Start: 2023-03-29

## 2023-06-08 ENCOUNTER — OFFICE VISIT (OUTPATIENT)
Dept: FAMILY MEDICINE CLINIC | Facility: CLINIC | Age: 63
End: 2023-06-08
Payer: MEDICAID

## 2023-06-08 VITALS
WEIGHT: 240 LBS | BODY MASS INDEX: 39.94 KG/M2 | SYSTOLIC BLOOD PRESSURE: 138 MMHG | HEART RATE: 132 BPM | DIASTOLIC BLOOD PRESSURE: 90 MMHG | OXYGEN SATURATION: 99 %

## 2023-06-08 DIAGNOSIS — I10 BENIGN ESSENTIAL HYPERTENSION: ICD-10-CM

## 2023-06-08 DIAGNOSIS — E78.2 MIXED HYPERLIPIDEMIA: ICD-10-CM

## 2023-06-08 DIAGNOSIS — Z79.899 HIGH RISK MEDICATIONS (NOT ANTICOAGULANTS) LONG-TERM USE: ICD-10-CM

## 2023-06-08 DIAGNOSIS — M15.9 PRIMARY OSTEOARTHRITIS INVOLVING MULTIPLE JOINTS: ICD-10-CM

## 2023-06-08 RX ORDER — TRAMADOL HYDROCHLORIDE 50 MG/1
50 TABLET ORAL 3 TIMES DAILY PRN
Qty: 90 TABLET | Refills: 2 | Status: CANCELLED | OUTPATIENT
Start: 2023-06-08

## 2023-06-08 RX ORDER — EZETIMIBE 10 MG/1
10 TABLET ORAL DAILY
Qty: 90 TABLET | Refills: 1 | Status: SHIPPED | OUTPATIENT
Start: 2023-06-08

## 2023-06-08 RX ORDER — AMLODIPINE BESYLATE 5 MG/1
5 TABLET ORAL DAILY
Qty: 90 TABLET | Refills: 1 | Status: SHIPPED | OUTPATIENT
Start: 2023-06-08

## 2023-06-10 DIAGNOSIS — Z79.899 HIGH RISK MEDICATIONS (NOT ANTICOAGULANTS) LONG-TERM USE: ICD-10-CM

## 2023-06-10 DIAGNOSIS — M15.9 PRIMARY OSTEOARTHRITIS INVOLVING MULTIPLE JOINTS: ICD-10-CM

## 2023-06-10 RX ORDER — TRAMADOL HYDROCHLORIDE 50 MG/1
50 TABLET ORAL 3 TIMES DAILY PRN
Qty: 90 TABLET | Refills: 2 | Status: SHIPPED | OUTPATIENT
Start: 2023-06-10

## 2023-06-11 DIAGNOSIS — I10 BENIGN ESSENTIAL HYPERTENSION: ICD-10-CM

## 2023-06-12 RX ORDER — AMLODIPINE BESYLATE 5 MG/1
TABLET ORAL
Qty: 90 TABLET | Refills: 1 | OUTPATIENT
Start: 2023-06-12

## 2023-09-06 DIAGNOSIS — I10 BENIGN ESSENTIAL HYPERTENSION: ICD-10-CM

## 2023-09-06 RX ORDER — LISINOPRIL 20 MG/1
TABLET ORAL
Qty: 90 TABLET | Refills: 1 | OUTPATIENT
Start: 2023-09-06

## 2023-09-11 ENCOUNTER — OFFICE VISIT (OUTPATIENT)
Dept: FAMILY MEDICINE CLINIC | Facility: CLINIC | Age: 63
End: 2023-09-11
Payer: COMMERCIAL

## 2023-09-11 VITALS
BODY MASS INDEX: 40.44 KG/M2 | SYSTOLIC BLOOD PRESSURE: 136 MMHG | DIASTOLIC BLOOD PRESSURE: 94 MMHG | WEIGHT: 243 LBS | TEMPERATURE: 97.4 F | HEART RATE: 119 BPM | OXYGEN SATURATION: 98 %

## 2023-09-11 DIAGNOSIS — Z79.899 HIGH RISK MEDICATIONS (NOT ANTICOAGULANTS) LONG-TERM USE: ICD-10-CM

## 2023-09-11 DIAGNOSIS — E78.2 MIXED HYPERLIPIDEMIA: ICD-10-CM

## 2023-09-11 DIAGNOSIS — Z12.31 ENCOUNTER FOR SCREENING MAMMOGRAM FOR BREAST CANCER: ICD-10-CM

## 2023-09-11 DIAGNOSIS — I10 BENIGN ESSENTIAL HYPERTENSION: Primary | ICD-10-CM

## 2023-09-11 DIAGNOSIS — M15.9 PRIMARY OSTEOARTHRITIS INVOLVING MULTIPLE JOINTS: ICD-10-CM

## 2023-09-11 PROCEDURE — 3075F SYST BP GE 130 - 139MM HG: CPT | Performed by: NURSE PRACTITIONER

## 2023-09-11 PROCEDURE — 1159F MED LIST DOCD IN RCRD: CPT | Performed by: NURSE PRACTITIONER

## 2023-09-11 PROCEDURE — 1160F RVW MEDS BY RX/DR IN RCRD: CPT | Performed by: NURSE PRACTITIONER

## 2023-09-11 PROCEDURE — 3080F DIAST BP >= 90 MM HG: CPT | Performed by: NURSE PRACTITIONER

## 2023-09-11 PROCEDURE — 99214 OFFICE O/P EST MOD 30 MIN: CPT | Performed by: NURSE PRACTITIONER

## 2023-09-11 RX ORDER — TRAMADOL HYDROCHLORIDE 50 MG/1
50 TABLET ORAL 3 TIMES DAILY PRN
Qty: 90 TABLET | Refills: 2 | Status: SHIPPED | OUTPATIENT
Start: 2023-09-11

## 2023-09-11 RX ORDER — EZETIMIBE 10 MG/1
10 TABLET ORAL DAILY
Qty: 90 TABLET | Refills: 1 | Status: SHIPPED | OUTPATIENT
Start: 2023-09-11

## 2023-09-11 RX ORDER — AMLODIPINE BESYLATE 10 MG/1
10 TABLET ORAL DAILY
Qty: 90 TABLET | Refills: 0 | Status: SHIPPED | OUTPATIENT
Start: 2023-09-11

## 2023-09-11 NOTE — PROGRESS NOTES
Chief Complaint  Hypertension (refills) and Pain (Tramadol refill )    Subjective    Marija Burgos presents to [facility]  History of Present Illness    Essential hypertension, currently prescribed amlodipine 5 mg daily.  Blood pressure is elevated on exam today, 146/92. Her last few BP have been elevated - >135/80.  She has not been checking her BP as of late. She denies any chest pain, palpitations, dizziness, vision changes, shortness of breath, or persistent lower extremity edema. Occasionally she will have LE edema when more sedentary - like when on long car rides, or when she is sitting and quilting.      She is taking Zetia for her dyslipidemia.  No complaints of myalgia with use.      She is prescribed tramadol 50 mg 3 times daily for treatment of osteoarthritis of multiple joints.  She has chronic kidney disease, stage III.  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 She is not working anywhere currently.  She denies any adverse side effects with use of tramadol. She does take it routinely, TID.     PEG: A Three-Item Scale Assessing Pain Intensity and Interference  0 being no pain 10 pain as bad as you can imagine  What number best describes your pain on average in the past week? 1  2.  What number best describes how, during the past week, pain has interfered with your enjoyment of life? 0  3.  What number best describes how, during the past week, pain has interfered with your general activity? 0      Objective   Vital Signs:   Vitals:    09/11/23 1408 09/11/23 1447   BP: 146/92 136/94   BP Location:  Right arm   Patient Position:  Sitting   Cuff Size:  Large Adult   Pulse: 119    Temp: 97.4 °F (36.3 °C)    SpO2: 98%    Weight: 110 kg (243 lb)        Wt Readings from Last 3 Encounters:   09/11/23 110 kg (243 lb)   06/08/23 109 kg (240 lb)   03/08/23 116 kg (255 lb)     BP Readings from Last 3 Encounters:   09/11/23  136/94   06/08/23 138/90   03/08/23 138/80     Physical Exam  Vitals reviewed.   Constitutional:       General: She is not in acute distress.     Appearance: She is well-developed. She is obese.   HENT:      Head: Normocephalic and atraumatic.   Eyes:      General: No scleral icterus.        Right eye: No discharge.         Left eye: No discharge.      Extraocular Movements: Extraocular movements intact.      Conjunctiva/sclera: Conjunctivae normal.   Neck:      Thyroid: No thyroid mass, thyromegaly or thyroid tenderness.      Vascular: No carotid bruit.      Trachea: Trachea normal.   Cardiovascular:      Rate and Rhythm: Normal rate and regular rhythm.      Pulses: Normal pulses.      Heart sounds: No murmur heard.  Pulmonary:      Effort: Pulmonary effort is normal. No respiratory distress.      Breath sounds: Normal breath sounds. No wheezing, rhonchi or rales.   Musculoskeletal:         General: Normal range of motion.      Cervical back: Normal range of motion and neck supple. No tenderness.      Right lower leg: No edema.      Left lower leg: No edema.   Lymphadenopathy:      Cervical: No cervical adenopathy.   Skin:     General: Skin is warm and dry.   Neurological:      Mental Status: She is alert and oriented to person, place, and time.   Psychiatric:         Mood and Affect: Mood and affect normal.         Behavior: Behavior normal.         Thought Content: Thought content normal.         Judgment: Judgment normal.        Result Review :  The following data was reviewed by: SAULO Scherer on 09/11/2023:    No visits with results within 1 Month(s) from this visit.   Latest known visit with results is:   Office Visit on 03/08/2023   Component Date Value    WBC 03/08/2023 7.08     RBC 03/08/2023 4.69     Hemoglobin 03/08/2023 14.5     Hematocrit 03/08/2023 41.8     MCV 03/08/2023 89.1     MCH 03/08/2023 30.9     MCHC 03/08/2023 34.7     RDW 03/08/2023 12.7     RDW-SD 03/08/2023 41.5     MPV  03/08/2023 9.7     Platelets 03/08/2023 254     Neutrophil % 03/08/2023 53.7     Lymphocyte % 03/08/2023 36.2     Monocyte % 03/08/2023 6.9     Eosinophil % 03/08/2023 2.5     Basophil % 03/08/2023 0.4     Immature Grans % 03/08/2023 0.3     Neutrophils, Absolute 03/08/2023 3.80     Lymphocytes, Absolute 03/08/2023 2.56     Monocytes, Absolute 03/08/2023 0.49     Eosinophils, Absolute 03/08/2023 0.18     Basophils, Absolute 03/08/2023 0.03     Immature Grans, Absolute 03/08/2023 0.02     nRBC 03/08/2023 0.0     Glucose 03/08/2023 84     BUN 03/08/2023 19     Creatinine 03/08/2023 1.06 (H)     Sodium 03/08/2023 140     Potassium 03/08/2023 5.4 (H)     Chloride 03/08/2023 102     CO2 03/08/2023 28.0     Calcium 03/08/2023 9.9     Total Protein 03/08/2023 7.4     Albumin 03/08/2023 4.4     ALT (SGPT) 03/08/2023 36 (H)     AST (SGOT) 03/08/2023 15     Alkaline Phosphatase 03/08/2023 85     Total Bilirubin 03/08/2023 0.4     Globulin 03/08/2023 3.0     A/G Ratio 03/08/2023 1.5     BUN/Creatinine Ratio 03/08/2023 17.9     Anion Gap 03/08/2023 10.0     eGFR 03/08/2023 59.5 (L)     Total Cholesterol 03/08/2023 213 (H)     Triglycerides 03/08/2023 153 (H)     HDL Cholesterol 03/08/2023 45     LDL Cholesterol  03/08/2023 140 (H)     VLDL Cholesterol 03/08/2023 28     LDL/HDL Ratio 03/08/2023 3.05     TSH 03/08/2023 3.500     Free T4 03/08/2023 1.19     Microalbumin/Creatinine * 03/08/2023      Creatinine, Urine 03/08/2023 79.5     Microalbumin, Urine 03/08/2023 <1.2     Amphet/Methamphet, Screen 03/08/2023 Negative     Barbiturates Screen, Uri* 03/08/2023 Negative     Benzodiazepine Screen, U* 03/08/2023 Negative     Cocaine Screen, Urine 03/08/2023 Negative     Opiate Screen 03/08/2023 Negative     THC, Screen, Urine 03/08/2023 Negative     Methadone Screen, Urine 03/08/2023 Negative     Oxycodone Screen, Urine 03/08/2023 Negative      Mammo Screening Bilateral With CAD (01/31/2022 09:03)     Procedures        Assessment and  Plan   Diagnoses and all orders for this visit:    1. Benign essential hypertension (Primary)  -     amLODIPine (Norvasc) 10 MG tablet; Take 1 tablet by mouth Daily.  Dispense: 90 tablet; Refill: 0  -     CBC Auto Differential; Future  -     Comprehensive Metabolic Panel; Future  -     Lipid Panel; Future  -     TSH+Free T4; Future  -     Microalbumin / Creatinine Urine Ratio - Urine, Clean Catch; Future    2. Mixed hyperlipidemia  -     ezetimibe (ZETIA) 10 MG tablet; Take 1 tablet by mouth Daily.  Dispense: 90 tablet; Refill: 1  -     Comprehensive Metabolic Panel; Future  -     Lipid Panel; Future    3. Primary osteoarthritis involving multiple joints  -     traMADol (ULTRAM) 50 MG tablet; Take 1 tablet by mouth 3 (Three) Times a Day As Needed for Moderate Pain or Severe Pain.  Dispense: 90 tablet; Refill: 2    4. High risk medications (not anticoagulants) long-term use  -     traMADol (ULTRAM) 50 MG tablet; Take 1 tablet by mouth 3 (Three) Times a Day As Needed for Moderate Pain or Severe Pain.  Dispense: 90 tablet; Refill: 2    5. Encounter for screening mammogram for breast cancer  -     Mammo Screening Digital Tomosynthesis Bilateral With CAD; Future          FOLLOW UP  Return in about 3 months (around 12/11/2023) for Next scheduled follow up.    Increase amlodipine from 5 mg daily to 10 mg daily.  Monitor blood pressure at home and call if hypotension should occur.  Goal blood pressure would be less than 120/80.  Continue Zetia.  Continue tramadol.  She will return to the office for fasting labs at her earliest convenience.    I will place an order for mammogram, but she does not want this any sooner than a 2-year follow-up.  She already has an order for Cologuard which will be due in October.    Patient was given instructions and counseling regarding her condition or for health maintenance advice. Please see specific information pulled into the AVS if appropriate.       Michelle Rodríguez,  APRN  09/11/23  14:49 EDT    CURRENT & DISCONTINUED MEDICATIONS  Current Outpatient Medications   Medication Instructions    amLODIPine (NORVASC) 10 mg, Oral, Daily    docusate sodium (COLACE) 100 mg, Oral, 2 Times Daily    esomeprazole (NEXIUM) 20 mg, Oral, Daily    ezetimibe (ZETIA) 10 mg, Oral, Daily    traMADol (ULTRAM) 50 mg, Oral, 3 Times Daily PRN       Medications Discontinued During This Encounter   Medication Reason    amLODIPine (Norvasc) 5 MG tablet Reorder    ezetimibe (ZETIA) 10 MG tablet Reorder    traMADol (ULTRAM) 50 MG tablet Reorder

## 2023-10-09 ENCOUNTER — CLINICAL SUPPORT (OUTPATIENT)
Dept: FAMILY MEDICINE CLINIC | Facility: CLINIC | Age: 63
End: 2023-10-09
Payer: COMMERCIAL

## 2023-10-09 DIAGNOSIS — I10 BENIGN ESSENTIAL HYPERTENSION: ICD-10-CM

## 2023-10-09 DIAGNOSIS — E78.2 MIXED HYPERLIPIDEMIA: ICD-10-CM

## 2023-10-09 LAB
ALBUMIN SERPL-MCNC: 4.3 G/DL (ref 3.5–5.2)
ALBUMIN UR-MCNC: 1.5 MG/DL
ALBUMIN/GLOB SERPL: 1.4 G/DL
ALP SERPL-CCNC: 84 U/L (ref 39–117)
ALT SERPL W P-5'-P-CCNC: 22 U/L (ref 1–33)
ANION GAP SERPL CALCULATED.3IONS-SCNC: 11.1 MMOL/L (ref 5–15)
AST SERPL-CCNC: 10 U/L (ref 1–32)
BASOPHILS # BLD AUTO: 0.03 10*3/MM3 (ref 0–0.2)
BASOPHILS NFR BLD AUTO: 0.5 % (ref 0–1.5)
BILIRUB SERPL-MCNC: 0.4 MG/DL (ref 0–1.2)
BUN SERPL-MCNC: 20 MG/DL (ref 8–23)
BUN/CREAT SERPL: 20 (ref 7–25)
CALCIUM SPEC-SCNC: 9.9 MG/DL (ref 8.6–10.5)
CHLORIDE SERPL-SCNC: 103 MMOL/L (ref 98–107)
CHOLEST SERPL-MCNC: 187 MG/DL (ref 0–200)
CO2 SERPL-SCNC: 25.9 MMOL/L (ref 22–29)
CREAT SERPL-MCNC: 1 MG/DL (ref 0.57–1)
CREAT UR-MCNC: 55.9 MG/DL
DEPRECATED RDW RBC AUTO: 39.6 FL (ref 37–54)
EGFRCR SERPLBLD CKD-EPI 2021: 63.8 ML/MIN/1.73
EOSINOPHIL # BLD AUTO: 0.25 10*3/MM3 (ref 0–0.4)
EOSINOPHIL NFR BLD AUTO: 3.8 % (ref 0.3–6.2)
ERYTHROCYTE [DISTWIDTH] IN BLOOD BY AUTOMATED COUNT: 12.5 % (ref 12.3–15.4)
GLOBULIN UR ELPH-MCNC: 3 GM/DL
GLUCOSE SERPL-MCNC: 82 MG/DL (ref 65–99)
HCT VFR BLD AUTO: 42.7 % (ref 34–46.6)
HDLC SERPL-MCNC: 69 MG/DL (ref 40–60)
HGB BLD-MCNC: 14.6 G/DL (ref 12–15.9)
IMM GRANULOCYTES # BLD AUTO: 0.02 10*3/MM3 (ref 0–0.05)
IMM GRANULOCYTES NFR BLD AUTO: 0.3 % (ref 0–0.5)
LDLC SERPL CALC-MCNC: 105 MG/DL (ref 0–100)
LDLC/HDLC SERPL: 1.51 {RATIO}
LYMPHOCYTES # BLD AUTO: 2.76 10*3/MM3 (ref 0.7–3.1)
LYMPHOCYTES NFR BLD AUTO: 41.8 % (ref 19.6–45.3)
MCH RBC QN AUTO: 30 PG (ref 26.6–33)
MCHC RBC AUTO-ENTMCNC: 34.2 G/DL (ref 31.5–35.7)
MCV RBC AUTO: 87.9 FL (ref 79–97)
MICROALBUMIN/CREAT UR: 26.8 MG/G (ref 0–29)
MONOCYTES # BLD AUTO: 0.5 10*3/MM3 (ref 0.1–0.9)
MONOCYTES NFR BLD AUTO: 7.6 % (ref 5–12)
NEUTROPHILS NFR BLD AUTO: 3.04 10*3/MM3 (ref 1.7–7)
NEUTROPHILS NFR BLD AUTO: 46 % (ref 42.7–76)
NRBC BLD AUTO-RTO: 0 /100 WBC (ref 0–0.2)
PLATELET # BLD AUTO: 277 10*3/MM3 (ref 140–450)
PMV BLD AUTO: 9.7 FL (ref 6–12)
POTASSIUM SERPL-SCNC: 4.5 MMOL/L (ref 3.5–5.2)
PROT SERPL-MCNC: 7.3 G/DL (ref 6–8.5)
RBC # BLD AUTO: 4.86 10*6/MM3 (ref 3.77–5.28)
SODIUM SERPL-SCNC: 140 MMOL/L (ref 136–145)
T4 FREE SERPL-MCNC: 1.42 NG/DL (ref 0.93–1.7)
TRIGL SERPL-MCNC: 69 MG/DL (ref 0–150)
TSH SERPL DL<=0.05 MIU/L-ACNC: 4.32 UIU/ML (ref 0.27–4.2)
VLDLC SERPL-MCNC: 13 MG/DL (ref 5–40)
WBC NRBC COR # BLD: 6.6 10*3/MM3 (ref 3.4–10.8)

## 2023-10-09 PROCEDURE — 80053 COMPREHEN METABOLIC PANEL: CPT | Performed by: NURSE PRACTITIONER

## 2023-10-09 PROCEDURE — 84439 ASSAY OF FREE THYROXINE: CPT | Performed by: NURSE PRACTITIONER

## 2023-10-09 PROCEDURE — 82570 ASSAY OF URINE CREATININE: CPT | Performed by: NURSE PRACTITIONER

## 2023-10-09 PROCEDURE — 82043 UR ALBUMIN QUANTITATIVE: CPT | Performed by: NURSE PRACTITIONER

## 2023-10-09 PROCEDURE — 84443 ASSAY THYROID STIM HORMONE: CPT | Performed by: NURSE PRACTITIONER

## 2023-10-09 PROCEDURE — 80061 LIPID PANEL: CPT | Performed by: NURSE PRACTITIONER

## 2023-10-09 PROCEDURE — 85025 COMPLETE CBC W/AUTO DIFF WBC: CPT | Performed by: NURSE PRACTITIONER

## 2023-10-09 NOTE — PROGRESS NOTES
Venipuncture Blood Specimen Collection  Venipuncture performed in left arm by Chris Valdez with good hemostasis. Patient tolerated the procedure well without complications.   10/09/23   Chris Valdez

## 2023-10-10 DIAGNOSIS — R94.6 ABNORMAL FINDING ON THYROID FUNCTION TEST: Primary | ICD-10-CM

## 2023-12-08 DIAGNOSIS — I10 BENIGN ESSENTIAL HYPERTENSION: ICD-10-CM

## 2023-12-08 RX ORDER — AMLODIPINE BESYLATE 10 MG/1
10 TABLET ORAL DAILY
Qty: 90 TABLET | Refills: 0 | OUTPATIENT
Start: 2023-12-08

## 2023-12-14 ENCOUNTER — OFFICE VISIT (OUTPATIENT)
Dept: FAMILY MEDICINE CLINIC | Facility: CLINIC | Age: 63
End: 2023-12-14
Payer: COMMERCIAL

## 2023-12-14 VITALS
HEART RATE: 82 BPM | SYSTOLIC BLOOD PRESSURE: 130 MMHG | DIASTOLIC BLOOD PRESSURE: 80 MMHG | OXYGEN SATURATION: 99 % | WEIGHT: 246 LBS | BODY MASS INDEX: 40.94 KG/M2

## 2023-12-14 DIAGNOSIS — M15.9 PRIMARY OSTEOARTHRITIS INVOLVING MULTIPLE JOINTS: ICD-10-CM

## 2023-12-14 DIAGNOSIS — R94.6 ABNORMAL FINDING ON THYROID FUNCTION TEST: ICD-10-CM

## 2023-12-14 DIAGNOSIS — I10 BENIGN ESSENTIAL HYPERTENSION: Primary | ICD-10-CM

## 2023-12-14 DIAGNOSIS — Z79.899 HIGH RISK MEDICATIONS (NOT ANTICOAGULANTS) LONG-TERM USE: ICD-10-CM

## 2023-12-14 LAB — TSH SERPL DL<=0.05 MIU/L-ACNC: 2.39 UIU/ML (ref 0.27–4.2)

## 2023-12-14 PROCEDURE — 84443 ASSAY THYROID STIM HORMONE: CPT | Performed by: NURSE PRACTITIONER

## 2023-12-14 RX ORDER — TRAMADOL HYDROCHLORIDE 50 MG/1
50 TABLET ORAL 3 TIMES DAILY PRN
Qty: 90 TABLET | Refills: 2 | Status: SHIPPED | OUTPATIENT
Start: 2023-12-14

## 2023-12-14 RX ORDER — AMLODIPINE BESYLATE 10 MG/1
10 TABLET ORAL DAILY
Qty: 90 TABLET | Refills: 0 | Status: SHIPPED | OUTPATIENT
Start: 2023-12-14

## 2023-12-14 NOTE — PROGRESS NOTES
Chief Complaint  Osteoarthritis (Refill tramadol )    History of Present Illness  Marija Burgos is a 63 y.o. female who presents to [facility] with a past medical history of    Past Medical History:   Diagnosis Date    Benign essential hypertension     GERD (gastroesophageal reflux disease)     High risk medications (not anticoagulants) long-term use 2020    HLD (hyperlipidemia)     Osteoarthritis 2020    Stage 3 chronic kidney disease 2020    Statin not tolerated 2020     Marija presents to the office today for follow-up on osteoarthritis/pain management.  It is her 3-month follow-up for refill of tramadol.  She is currently prescribed 50 mg 3 times daily for management of chronic osteoarthritis pain involving multiple joints.  She is unable to take NSAIDs secondary to chronic kidney disease, stage III.  She is no longer working.  She reports that since she is no longer working her pain is much more manageable because she can do what she wants to do when she wants to do it based on her pain level.    PEG: A Three-Item Scale Assessing Pain Intensity and Interference  0 being no pain 10 pain as bad as you can imagine  What number best describes your pain on average in the past week? 1  2.  What number best describes how, during the past week, pain has interfered with your enjoyment of life? 0  3.  What number best describes how, during the past week, pain has interfered with your general activity? 1    Additionally, she is needing a repeat TSH.  Her last TSH was abnormal, but just barely.  No history of hypothyroidism previously.    Blood pressure is also better controlled with recent increase of amlodipine from 5 mg daily to 10 mg daily.  She denies chest pain, palpitations, headaches, dizziness, or shortness of breath.      Objective   Vital Signs:   Vitals:    12/14/23 1131   BP: 130/80   Pulse: 82   SpO2: 99%   Weight: 112 kg (246 lb)     Body mass index is 40.94 kg/m².    Wt  Readings from Last 3 Encounters:   12/14/23 112 kg (246 lb)   09/11/23 110 kg (243 lb)   06/08/23 109 kg (240 lb)     BP Readings from Last 3 Encounters:   12/14/23 130/80   09/11/23 136/94   06/08/23 138/90       Health Maintenance   Topic Date Due    TDAP/TD VACCINES (1 - Tdap) Never done    ZOSTER VACCINE (1 of 2) Never done    ANNUAL PHYSICAL  Never done    PAP SMEAR  Never done    INFLUENZA VACCINE  03/31/2024 (Originally 8/1/2023)    MAMMOGRAM  01/31/2024    LIPID PANEL  10/09/2024    COLORECTAL CANCER SCREENING  10/29/2026    HEPATITIS C SCREENING  Completed    Pneumococcal Vaccine 0-64  Aged Out    COVID-19 Vaccine  Discontinued       Physical Exam  Vitals reviewed.   Constitutional:       General: She is not in acute distress.     Appearance: She is well-developed. She is obese.   HENT:      Head: Normocephalic and atraumatic.   Eyes:      General: No scleral icterus.     Extraocular Movements: Extraocular movements intact.      Conjunctiva/sclera: Conjunctivae normal.   Neck:      Trachea: Trachea normal.   Cardiovascular:      Rate and Rhythm: Normal rate and regular rhythm.      Pulses: Normal pulses.      Heart sounds: No murmur heard.  Pulmonary:      Effort: Pulmonary effort is normal. No respiratory distress.      Breath sounds: Normal breath sounds. No wheezing, rhonchi or rales.   Musculoskeletal:         General: Normal range of motion.      Cervical back: Normal range of motion and neck supple. No tenderness.      Right lower leg: No edema.      Left lower leg: No edema.   Lymphadenopathy:      Cervical: No cervical adenopathy.   Skin:     General: Skin is warm and dry.   Neurological:      Mental Status: She is alert and oriented to person, place, and time.   Psychiatric:         Mood and Affect: Mood and affect normal.         Behavior: Behavior normal.         Thought Content: Thought content normal.         Judgment: Judgment normal.            Result Review :  The following data was reviewed  by: Michelle Rodríguez, APRN on 12/14/2023:    No visits with results within 1 Month(s) from this visit.   Latest known visit with results is:   Results Encounter on 10/23/2023   Component Date Value    Cologuard 10/29/2023 Negative      Common labs          3/8/2023    08:41 10/9/2023    10:00 10/9/2023    10:05   Common Labs   Glucose 84  82     BUN 19  20     Creatinine 1.06  1.00     Sodium 140  140     Potassium 5.4  4.5     Chloride 102  103     Calcium 9.9  9.9     Albumin 4.4  4.3     Total Bilirubin 0.4  0.4     Alkaline Phosphatase 85  84     AST (SGOT) 15  10     ALT (SGPT) 36  22     WBC 7.08  6.60     Hemoglobin 14.5  14.6     Hematocrit 41.8  42.7     Platelets 254  277     Total Cholesterol 213  187     Triglycerides 153  69     HDL Cholesterol 45  69     LDL Cholesterol  140  105     Microalbumin, Urine <1.2   1.5      TSH          3/8/2023    08:41 10/9/2023    10:00   TSH   TSH 3.500  4.320      Last Urine Toxicity  More data exists         Latest Ref Rng & Units 10/9/2023 3/8/2023   LAST URINE TOXICITY RESULTS   Creatinine, Urine mg/dL 55.9  79.5    Barbiturates Screen, Urine Negative - Negative    Benzodiazepine Screen, Urine Negative - Negative    Cocaine Screen, Urine Negative - Negative    Methadone Screen , Urine Negative - Negative        Procedures        Assessment and Plan   Diagnoses and all orders for this visit:    1. Benign essential hypertension (Primary)  -     amLODIPine (Norvasc) 10 MG tablet; Take 1 tablet by mouth Daily.  Dispense: 90 tablet; Refill: 0    2. Primary osteoarthritis involving multiple joints  -     traMADol (ULTRAM) 50 MG tablet; Take 1 tablet by mouth 3 (Three) Times a Day As Needed for Moderate Pain or Severe Pain.  Dispense: 90 tablet; Refill: 2    3. High risk medications (not anticoagulants) long-term use  -     traMADol (ULTRAM) 50 MG tablet; Take 1 tablet by mouth 3 (Three) Times a Day As Needed for Moderate Pain or Severe Pain.  Dispense: 90 tablet;  Refill: 2    4. Abnormal finding on thyroid function test  -     TSH Rfx On Abnormal To Free T4                  FOLLOW UP  Return in about 3 months (around 3/14/2024) for Annual physical, medication refills and fasting labs.    Patient was given instructions and counseling regarding her condition or for health maintenance advice. Please see specific information pulled into the AVS if appropriate.       Michelle Rodríguez, APRN  12/14/23  12:13 EST    CURRENT & DISCONTINUED MEDICATIONS  Current Outpatient Medications   Medication Instructions    amLODIPine (NORVASC) 10 mg, Oral, Daily    docusate sodium (COLACE) 100 mg, Oral, 2 Times Daily    esomeprazole (NEXIUM) 20 mg, Oral, Daily    ezetimibe (ZETIA) 10 mg, Oral, Daily    traMADol (ULTRAM) 50 mg, Oral, 3 Times Daily PRN       Medications Discontinued During This Encounter   Medication Reason    amLODIPine (Norvasc) 10 MG tablet Reorder    traMADol (ULTRAM) 50 MG tablet Reorder

## 2023-12-14 NOTE — PROGRESS NOTES
Venipuncture Blood Specimen Collection  Venipuncture performed in left arm by Maddie Cohen with good hemostasis. Patient tolerated the procedure well without complications.   12/14/23   Maddie Cohen

## 2024-03-09 DIAGNOSIS — I10 BENIGN ESSENTIAL HYPERTENSION: ICD-10-CM

## 2024-03-11 RX ORDER — AMLODIPINE BESYLATE 10 MG/1
10 TABLET ORAL DAILY
Qty: 90 TABLET | Refills: 0 | Status: SHIPPED | OUTPATIENT
Start: 2024-03-11

## 2024-03-12 DIAGNOSIS — Z79.899 HIGH RISK MEDICATIONS (NOT ANTICOAGULANTS) LONG-TERM USE: ICD-10-CM

## 2024-03-12 DIAGNOSIS — M15.9 PRIMARY OSTEOARTHRITIS INVOLVING MULTIPLE JOINTS: ICD-10-CM

## 2024-03-13 DIAGNOSIS — Z79.899 HIGH RISK MEDICATIONS (NOT ANTICOAGULANTS) LONG-TERM USE: ICD-10-CM

## 2024-03-13 DIAGNOSIS — M15.9 PRIMARY OSTEOARTHRITIS INVOLVING MULTIPLE JOINTS: ICD-10-CM

## 2024-03-13 RX ORDER — TRAMADOL HYDROCHLORIDE 50 MG/1
TABLET ORAL
Qty: 90 TABLET | OUTPATIENT
Start: 2024-03-13

## 2024-04-29 DIAGNOSIS — E78.2 MIXED HYPERLIPIDEMIA: ICD-10-CM

## 2024-04-29 RX ORDER — EZETIMIBE 10 MG/1
10 TABLET ORAL DAILY
Qty: 90 TABLET | Refills: 1 | OUTPATIENT
Start: 2024-04-29